# Patient Record
Sex: MALE | Race: WHITE | NOT HISPANIC OR LATINO | Employment: OTHER | URBAN - METROPOLITAN AREA
[De-identification: names, ages, dates, MRNs, and addresses within clinical notes are randomized per-mention and may not be internally consistent; named-entity substitution may affect disease eponyms.]

---

## 2017-02-07 ENCOUNTER — TELEPHONE (OUTPATIENT)
Dept: FAMILY MEDICINE | Facility: OTHER | Age: 42
End: 2017-02-07

## 2017-02-07 NOTE — TELEPHONE ENCOUNTER
Panel Management Review      Patient has the following on his problem list: None      Composite cancer screening  Chart review shows that this patient is due/due soon for the following None  Summary:    Patient is due/failing the following:   Creatinine, A1c    Action needed:   Patient needs non-fasting lab only appointment    Type of outreach:    Phone, spoke to patient.  spoke with patients wife Elida per [facility], she states she is unsure if the labs will be covered by insurance and will check with them and schedule lab appt if they are covered.    Questions for provider review:    None                                                                                                                                    Karen Swartz CMA       Chart routed to Care Team .

## 2017-02-22 DIAGNOSIS — Z72.0 TOBACCO ABUSE: ICD-10-CM

## 2017-02-23 NOTE — TELEPHONE ENCOUNTER
varenicline (CHANTIX) 1 MG tablet     Last Written Prescription Date: 11/2/2016  Last Fill Quantity: 56, # refills: 2  Last Office Visit with [facility], [facility] or [facility] prescribing provider: 11/9/2016        BP Readings from Last 3 Encounters:   11/17/16 121/86   11/10/16 124/90   11/09/16 110/76

## 2017-02-27 RX ORDER — VARENICLINE TARTRATE 1 MG/1
TABLET, FILM COATED ORAL
Qty: 56 TABLET | Refills: 2 | Status: SHIPPED | OUTPATIENT
Start: 2017-02-27 | End: 2018-01-04

## 2017-02-27 NOTE — TELEPHONE ENCOUNTER
Routing refill request to provider for review/approval because:  Patient has been given a starter pack along with the below Rx.   Per RN protocol: Chantix up to 12 weeks with one refill.    Patient is also due for non-fasting labs.     Carol Ha RN

## 2017-06-19 ENCOUNTER — ANESTHESIA EVENT (OUTPATIENT)
Dept: GASTROENTEROLOGY | Facility: CLINIC | Age: 42
End: 2017-06-19
Payer: COMMERCIAL

## 2017-06-19 ENCOUNTER — SURGERY (OUTPATIENT)
Age: 42
End: 2017-06-19

## 2017-06-19 ENCOUNTER — ANESTHESIA (OUTPATIENT)
Dept: GASTROENTEROLOGY | Facility: CLINIC | Age: 42
End: 2017-06-19
Payer: COMMERCIAL

## 2017-06-19 ENCOUNTER — HOSPITAL ENCOUNTER (OUTPATIENT)
Facility: CLINIC | Age: 42
Discharge: HOME | End: 2017-06-19
Attending: FAMILY MEDICINE | Admitting: FAMILY MEDICINE
Payer: COMMERCIAL

## 2017-06-19 VITALS
DIASTOLIC BLOOD PRESSURE: 98 MMHG | OXYGEN SATURATION: 100 % | SYSTOLIC BLOOD PRESSURE: 143 MMHG | TEMPERATURE: 97.8 F | RESPIRATION RATE: 16 BRPM

## 2017-06-19 LAB — COLONOSCOPY: NORMAL

## 2017-06-19 PROCEDURE — 25000125 ZZHC RX 250: Performed by: NURSE ANESTHETIST, CERTIFIED REGISTERED

## 2017-06-19 PROCEDURE — 45385 COLONOSCOPY W/LESION REMOVAL: CPT | Performed by: FAMILY MEDICINE

## 2017-06-19 PROCEDURE — 45385 COLONOSCOPY W/LESION REMOVAL: CPT | Mod: PT | Performed by: FAMILY MEDICINE

## 2017-06-19 PROCEDURE — 88305 TISSUE EXAM BY PATHOLOGIST: CPT | Performed by: FAMILY MEDICINE

## 2017-06-19 PROCEDURE — 25000128 H RX IP 250 OP 636: Performed by: NURSE ANESTHETIST, CERTIFIED REGISTERED

## 2017-06-19 PROCEDURE — 88305 TISSUE EXAM BY PATHOLOGIST: CPT | Mod: 26 | Performed by: FAMILY MEDICINE

## 2017-06-19 PROCEDURE — 45380 COLONOSCOPY AND BIOPSY: CPT | Mod: 59 | Performed by: FAMILY MEDICINE

## 2017-06-19 PROCEDURE — 40000296 ZZH STATISTIC ENDO RECOVERY CLASS 1:2 FIRST HOUR: Performed by: FAMILY MEDICINE

## 2017-06-19 PROCEDURE — 40000297 ZZH STATISTIC ENDO RECOVERY CLASS 1:2 EACH ADDL HOUR: Performed by: FAMILY MEDICINE

## 2017-06-19 PROCEDURE — 45380 COLONOSCOPY AND BIOPSY: CPT | Mod: PT,XU | Performed by: FAMILY MEDICINE

## 2017-06-19 RX ORDER — PROPOFOL 10 MG/ML
INJECTION, EMULSION INTRAVENOUS CONTINUOUS PRN
Status: DISCONTINUED | OUTPATIENT
Start: 2017-06-19 | End: 2017-06-19

## 2017-06-19 RX ORDER — LIDOCAINE HYDROCHLORIDE 20 MG/ML
INJECTION, SOLUTION INFILTRATION; PERINEURAL PRN
Status: DISCONTINUED | OUTPATIENT
Start: 2017-06-19 | End: 2017-06-19

## 2017-06-19 RX ORDER — SODIUM CHLORIDE, SODIUM LACTATE, POTASSIUM CHLORIDE, CALCIUM CHLORIDE 600; 310; 30; 20 MG/100ML; MG/100ML; MG/100ML; MG/100ML
INJECTION, SOLUTION INTRAVENOUS CONTINUOUS
Status: DISCONTINUED | OUTPATIENT
Start: 2017-06-19 | End: 2017-06-19 | Stop reason: HOSPADM

## 2017-06-19 RX ORDER — PROPOFOL 10 MG/ML
INJECTION, EMULSION INTRAVENOUS PRN
Status: DISCONTINUED | OUTPATIENT
Start: 2017-06-19 | End: 2017-06-19

## 2017-06-19 RX ORDER — ONDANSETRON 2 MG/ML
4 INJECTION INTRAMUSCULAR; INTRAVENOUS
Status: DISCONTINUED | OUTPATIENT
Start: 2017-06-19 | End: 2017-06-19 | Stop reason: HOSPADM

## 2017-06-19 RX ORDER — MEPERIDINE HYDROCHLORIDE 25 MG/ML
12.5 INJECTION INTRAMUSCULAR; INTRAVENOUS; SUBCUTANEOUS
Status: DISCONTINUED | OUTPATIENT
Start: 2017-06-19 | End: 2017-06-19 | Stop reason: HOSPADM

## 2017-06-19 RX ORDER — ONDANSETRON 2 MG/ML
4 INJECTION INTRAMUSCULAR; INTRAVENOUS EVERY 30 MIN PRN
Status: DISCONTINUED | OUTPATIENT
Start: 2017-06-19 | End: 2017-06-19 | Stop reason: HOSPADM

## 2017-06-19 RX ORDER — ONDANSETRON 4 MG/1
4 TABLET, ORALLY DISINTEGRATING ORAL EVERY 30 MIN PRN
Status: DISCONTINUED | OUTPATIENT
Start: 2017-06-19 | End: 2017-06-19 | Stop reason: HOSPADM

## 2017-06-19 RX ORDER — NALOXONE HYDROCHLORIDE 0.4 MG/ML
.1-.4 INJECTION, SOLUTION INTRAMUSCULAR; INTRAVENOUS; SUBCUTANEOUS
Status: DISCONTINUED | OUTPATIENT
Start: 2017-06-19 | End: 2017-06-19 | Stop reason: HOSPADM

## 2017-06-19 RX ORDER — LIDOCAINE 40 MG/G
CREAM TOPICAL
Status: DISCONTINUED | OUTPATIENT
Start: 2017-06-19 | End: 2017-06-19 | Stop reason: HOSPADM

## 2017-06-19 RX ORDER — LIDOCAINE 40 MG/G
CREAM TOPICAL
Status: DISCONTINUED | OUTPATIENT
Start: 2017-06-19 | End: 2017-06-19

## 2017-06-19 RX ADMIN — PROPOFOL 40 MG: 10 INJECTION, EMULSION INTRAVENOUS at 10:48

## 2017-06-19 RX ADMIN — SODIUM CHLORIDE, POTASSIUM CHLORIDE, SODIUM LACTATE AND CALCIUM CHLORIDE: 600; 310; 30; 20 INJECTION, SOLUTION INTRAVENOUS at 10:06

## 2017-06-19 RX ADMIN — LIDOCAINE HYDROCHLORIDE 1 ML: 10 INJECTION, SOLUTION EPIDURAL; INFILTRATION; INTRACAUDAL; PERINEURAL at 10:06

## 2017-06-19 RX ADMIN — PROPOFOL 150 MCG/KG/MIN: 10 INJECTION, EMULSION INTRAVENOUS at 10:54

## 2017-06-19 RX ADMIN — LIDOCAINE HYDROCHLORIDE 80 MG: 20 INJECTION, SOLUTION INFILTRATION; PERINEURAL at 10:40

## 2017-06-19 ASSESSMENT — LIFESTYLE VARIABLES: TOBACCO_USE: 1

## 2017-06-19 NOTE — ANESTHESIA POSTPROCEDURE EVALUATION
Patient: Oren Solano    Procedure(s):  colonoscopy with polypectomy by cold biopsy forceps, polypectomy by snare - Wound Class: II-Clean Contaminated   - Wound Class: II-Clean Contaminated    Diagnosis:screening, family hx colon cancer  Diagnosis Additional Information: No value filed.    Anesthesia Type:  MAC    Note:  Anesthesia Post Evaluation    Patient location during evaluation: Phase 2  Patient participation: Able to fully participate in evaluation  Level of consciousness: awake and alert  Pain management: adequate  Airway patency: patent  Cardiovascular status: blood pressure returned to baseline  Respiratory status: room air and spontaneous ventilation  Hydration status: euvolemic  PONV: none     Anesthetic complications: None    Comments: Patient denies any discomforts at this time. Resting without comlaint        Last vitals:  Vitals:    06/19/17 1012 06/19/17 1152 06/19/17 1200   BP: (!) 144/99 119/90 (!) 122/96   Resp:  16 16   Temp:      SpO2:  100% 100%         Electronically Signed By: ESTEFANÍA Estes CRNA  June 19, 2017  12:03 PM

## 2017-06-19 NOTE — DISCHARGE INSTRUCTIONS
[facility]    Home Care Following Endoscopy    Dr. Nance      Activity:    You have just undergone an endoscopic procedure usually performed with conscious sedation.  Do not work or operate machinery (including a car) for at least 12 hours.      I encourage you to walk and attempt to pass this air as soon as possible.    Diet:    Return to the diet you were on before your procedure but eat lightly for the first 12-24 hours.    Drink plenty of water.    Resume any regular medications unless otherwise advised by your physician.      If you had any biopsy or polyp removed please refrain from aspirin or aspirin products for 2 days.   Pain:    You may take Tylenol as needed for pain.  Expected Recovery:    You can expect some mild abdominal fullness and/or discomfort due to the air used to inflate your intestinal tract.     Call Your Physician if You Have:    After Colonoscopy:  o Worsening persisting abdominal pain which is worse with activity.  o Fevers (>101 degrees F), chills or shakes.  o Passage of continued blood with bowel movements.   Any questions or concerns about your recovery, please call [phone] or after hours [phone] Nurse Advice Line.    Follow-up Care:    You should receive a call or letter with your results within 1 week. Please call if you have not received a notification of your results.    If asked to return to clinic please make an appointment 1 week after your procedure.  Call [phone].    [facility]   Adult Discharge Orders & Instructions     For 24 hours after surgery    1. Get plenty of rest.  A responsible adult must stay with you for at least 24 hours after you leave the hospital.   2. Do not drive or use heavy equipment.    3. Do not drink alcohol.  4. Avoid strenuous or risky activities.  Ask for help when climbing stairs.   5. You may feel lightheaded.  IF so, sit for a few minutes before standing.  Have someone help you get up.   6. If you  have nausea (feel sick to your stomach): Drink only clear liquids such as apple juice, ginger ale, broth or 7-Up.  Rest may also help.  Be sure to drink enough fluids.  Move to a regular diet as you feel able.  7. You may have a slight fever. Call the doctor if your fever is over 100 F (37.7 C) (taken under the tongue) or lasts longer than 24 hours.  8. You may have a dry mouth, a sore throat, muscle aches or trouble sleeping.  These should go away after 24 hours.  9. Do not make important or legal decisions.   Call your doctor for any of the followin.  Signs of infection (fever, growing tenderness at the surgery site, a large amount of drainage or bleeding, severe pain, foul-smelling drainage, redness, swelling).    2. It has been over 8 to 10 hours since surgery and you are still not able to urinate (pass water).    3.  Headache for over 24 hours.

## 2017-06-19 NOTE — ANESTHESIA CARE TRANSFER NOTE
Patient: Oren Solano    Procedure(s):  colonoscopy with polypectomy by cold biopsy forceps, polypectomy by snare - Wound Class: II-Clean Contaminated   - Wound Class: II-Clean Contaminated    Diagnosis: screening, family hx colon cancer  Diagnosis Additional Information: No value filed.    Anesthesia Type:   MAC     Note:  Airway :Room Air  Patient transferred to:Phase II  Comments: Resting without complaint report to pacu rn      Vitals: (Last set prior to Anesthesia Care Transfer)    CRNA VITALS  6/19/2017 1120 - 6/19/2017 1202      6/19/2017             SpO2: 99 %                Electronically Signed By: ESTEFANÍA Estes CRNA  June 19, 2017  12:02 PM

## 2017-06-19 NOTE — IP AVS SNAPSHOT
MRN:[id number]                      After Visit Summary   6/19/2017    Oren Solano    MRN: [id number]           Thank you!     Thank you for choosing [facility] for your care. Our goal is always to provide you with excellent care. Hearing back from our patients is one way we can continue to improve our services. Please take a few minutes to complete the written survey that you may receive in the mail after you visit with us. Thank you!        Patient Information     Date Of Birth          1975        About your hospital stay     You were admitted on:  June 19, 2017 You last received care in the:  [facility]    You were discharged on:  June 19, 2017       Who to Call     For medical emergencies, please call 911.  For non-urgent questions about your medical care, please call your primary care provider or clinic, [phone]  For questions related to your surgery, please call your surgery clinic        Attending Provider     Provider Bipin Pires MD [facility] Practice       Primary Care Provider Office Phone # Fax #    Jewel Saxena PA-C [phone]      Further instructions from your care team         [facility]    Home Care Following Endoscopy    Dr. Nance      Activity:    You have just undergone an endoscopic procedure usually performed with conscious sedation.  Do not work or operate machinery (including a car) for at least 12 hours.      I encourage you to walk and attempt to pass this air as soon as possible.    Diet:    Return to the diet you were on before your procedure but eat lightly for the first 12-24 hours.    Drink plenty of water.    Resume any regular medications unless otherwise advised by your physician.      If you had any biopsy or polyp removed please refrain from aspirin or aspirin products for 2 days.   Pain:    You may take Tylenol as needed for pain.  Expected Recovery:    You can expect some  mild abdominal fullness and/or discomfort due to the air used to inflate your intestinal tract.     Call Your Physician if You Have:    After Colonoscopy:  o Worsening persisting abdominal pain which is worse with activity.  o Fevers (>101 degrees F), chills or shakes.  o Passage of continued blood with bowel movements.   Any questions or concerns about your recovery, please call [phone] or after hours [phone] Nurse Advice Line.    Follow-up Care:    You should receive a call or letter with your results within 1 week. Please call if you have not received a notification of your results.    If asked to return to clinic please make an appointment 1 week after your procedure.  Call [phone].    [facility]   Adult Discharge Orders & Instructions     For 24 hours after surgery    1. Get plenty of rest.  A responsible adult must stay with you for at least 24 hours after you leave the hospital.   2. Do not drive or use heavy equipment.    3. Do not drink alcohol.  4. Avoid strenuous or risky activities.  Ask for help when climbing stairs.   5. You may feel lightheaded.  IF so, sit for a few minutes before standing.  Have someone help you get up.   6. If you have nausea (feel sick to your stomach): Drink only clear liquids such as apple juice, ginger ale, broth or 7-Up.  Rest may also help.  Be sure to drink enough fluids.  Move to a regular diet as you feel able.  7. You may have a slight fever. Call the doctor if your fever is over 100 F (37.7 C) (taken under the tongue) or lasts longer than 24 hours.  8. You may have a dry mouth, a sore throat, muscle aches or trouble sleeping.  These should go away after 24 hours.  9. Do not make important or legal decisions.   Call your doctor for any of the followin.  Signs of infection (fever, growing tenderness at the surgery site, a large amount of drainage or bleeding, severe pain, foul-smelling drainage, redness, swelling).    2. It has been  "over 8 to 10 hours since surgery and you are still not able to urinate (pass water).    3.  Headache for over 24 hours.        Pending Results     Date and Time Order Name Status Description    2017 1058 Surgical pathology exam In process             Admission Information     Date & Time Provider Department Dept. Phone    2017 Bipin Nance MD [facility] [phone]      Your Vitals Were     Blood Pressure Temperature Respirations Pulse Oximetry          142/124 97.8  F (36.6  C) (Oral) 16 100%        MyChart Information     [facility] lets you send messages to your doctor, view your test results, renew your prescriptions, schedule appointments and more. To sign up, go to [email or web address] . Click on \"Log in\" on the left side of the screen, which will take you to the Welcome page. Then click on \"Sign up Now\" on the right side of the page.     You will be asked to enter the access code listed below, as well as some personal information. Please follow the directions to create your username and password.     Your access code is: [email or web address]  Expires: 2017 12:25 PM     Your access code will  in 90 days. If you need help or a new code, please call your [facility] clinic or [phone].        Care EveryWhere ID     This is your Care EveryWhere ID. This could be used by other organizations to access your [facility] medical records  [id number]           Review of your medicines      UNREVIEWED medicines. Ask your doctor about these medicines        Dose / Directions    ciprofloxacin 500 MG tablet   Commonly known as:  CIPRO   Used for:  Calculus of kidney        Dose:  500 mg   Take 1 tablet (500 mg) by mouth 2 times daily   Quantity:  6 tablet   Refills:  0       HYDROcodone-acetaminophen 5-325 MG per tablet   Commonly known as:  NORCO   Used for:  Calculus of kidney        Dose:  1-2 tablet   Take 1-2 tablets by mouth every 4 hours as needed for moderate to severe pain " (Moderate to Severe Pain)   Quantity:  30 tablet   Refills:  0       * IMITREX PO        Dose:  50 mg   Take 50 mg by mouth at onset of headache for migraine   Refills:  0       * SUMAtriptan 100 MG tablet   Commonly known as:  IMITREX   Used for:  Migraine without aura and without status migrainosus, not intractable        Dose:  100 mg   Take 1 tablet (100 mg) by mouth at onset of headache for migraine May repeat in 2 hours. Max 2 tablets/24 hours.   Quantity:  12 tablet   Refills:  11       lisinopril 10 MG tablet   Commonly known as:  PRINIVIL/ZESTRIL   Used for:  Benign essential hypertension        Dose:  10 mg   Take 1 tablet (10 mg) by mouth daily   Quantity:  90 tablet   Refills:  3       magnesium 100 MG Caps        Refills:  0       MULTIVITAMIN ADULT PO        Refills:  0       UNABLE TO FIND        MEDICATION NAME: Mark Jean Baptiste   Refills:  0       UNKNOWN TO PATIENT   Indication:  Unknown name - some sort of protein for migraines        Refills:  0       * varenicline 0.5 MG X 11 & 1 MG X 42 tablet   Commonly known as:  CHANTIX STARTING MONTH GUILLERMINA   Used for:  Tobacco abuse        Take 0.5 mg tab daily for 3 days, then 0.5 mg tab twice daily for 4 days, then 1 mg twice daily.   Quantity:  53 tablet   Refills:  0       * CHANTIX 1 MG tablet   Used for:  Tobacco abuse   Generic drug:  varenicline        TAKE ONE TABLET BY MOUTH TWICE DAILY   Quantity:  56 tablet   Refills:  2       * Notice:  This list has 4 medication(s) that are the same as other medications prescribed for you. Read the directions carefully, and ask your doctor or other care provider to review them with you.             Protect others around you: Learn how to safely use, store and throw away your medicines at www.disposemymeds.org.             Medication List: This is a list of all your medications and when to take them. Check marks below indicate your daily home schedule. Keep this list as a reference.      Medications            Morning Afternoon Evening Bedtime As Needed    ciprofloxacin 500 MG tablet   Commonly known as:  CIPRO   Take 1 tablet (500 mg) by mouth 2 times daily                                HYDROcodone-acetaminophen 5-325 MG per tablet   Commonly known as:  NORCO   Take 1-2 tablets by mouth every 4 hours as needed for moderate to severe pain (Moderate to Severe Pain)                                * IMITREX PO   Take 50 mg by mouth at onset of headache for migraine                                * SUMAtriptan 100 MG tablet   Commonly known as:  IMITREX   Take 1 tablet (100 mg) by mouth at onset of headache for migraine May repeat in 2 hours. Max 2 tablets/24 hours.                                lisinopril 10 MG tablet   Commonly known as:  PRINIVIL/ZESTRIL   Take 1 tablet (10 mg) by mouth daily                                magnesium 100 MG Caps                                MULTIVITAMIN ADULT PO                                UNABLE TO FIND   MEDICATION NAME: Mark Jean Baptiste                                UNKNOWN TO PATIENT                                * varenicline 0.5 MG X 11 & 1 MG X 42 tablet   Commonly known as:  CHANTIX STARTING MONTH GUILLERMINA   Take 0.5 mg tab daily for 3 days, then 0.5 mg tab twice daily for 4 days, then 1 mg twice daily.                                * CHANTIX 1 MG tablet   TAKE ONE TABLET BY MOUTH TWICE DAILY   Generic drug:  varenicline                                * Notice:  This list has 4 medication(s) that are the same as other medications prescribed for you. Read the directions carefully, and ask your doctor or other care provider to review them with you.

## 2017-06-19 NOTE — IP AVS SNAPSHOT
[facility]    [address] [address]    Phone:  [phone]                                       After Visit Summary   6/19/2017    Oren Solano    MRN: [id number]           After Visit Summary Signature Page     I have received my discharge instructions, and my questions have been answered. I have discussed any challenges I see with this plan with the nurse or doctor.    ..........................................................................................................................................  Patient/Patient Representative Signature      ..........................................................................................................................................  Patient Representative Print Name and Relationship to Patient    ..................................................               ................................................  Date                                            Time    ..........................................................................................................................................  Reviewed by Signature/Title    ...................................................              ..............................................  Date                                                            Time

## 2017-06-19 NOTE — ANESTHESIA PREPROCEDURE EVALUATION
Anesthesia Evaluation     . Pt has had prior anesthetic. Type: General           ROS/MED HX    ENT/Pulmonary:     (+)tobacco use, Current use , . .    Neurologic:     (+)migraines,     Cardiovascular:     (+) hypertension----. : . . . :. . No previous cardiac testing       METS/Exercise Tolerance:     Hematologic:  - neg hematologic  ROS       Musculoskeletal:  - neg musculoskeletal ROS       GI/Hepatic:  - neg GI/hepatic ROS       Renal/Genitourinary:     (+) Nephrolithiasis , Pt has no history of transplant,       Endo:  - neg endo ROS       Psychiatric:  - neg psychiatric ROS       Infectious Disease:  - neg infectious disease ROS       Malignancy:      - no malignancy   Other:    (+) No chance of pregnancy C-spine cleared: N/A,                    Physical Exam  Normal systems: cardiovascular, pulmonary and dental    Airway   Mallampati: II  TM distance: >3 FB  Neck ROM: full    Dental     Cardiovascular   Rhythm and rate: regular and normal      Pulmonary    breath sounds clear to auscultation                    Anesthesia Plan      History & Physical Review  History and physical reviewed and following examination; no interval change.    ASA Status:  2 .    NPO Status:  > 8 hours    Plan for MAC with Intravenous and Propofol induction. Reason for MAC:  Deep or markedly invasive procedure (G8)         Postoperative Care  Postoperative pain management:  Oral pain medications.      Consents  Anesthetic plan, risks, benefits and alternatives discussed with:  Patient..                          .

## 2017-06-19 NOTE — ANESTHESIA CARE TRANSFER NOTE
Patient: rOen Solano    Procedure(s):  colonoscopy with polypectomy by cold biopsy forceps, polypectomy by snare - Wound Class: II-Clean Contaminated   - Wound Class: II-Clean Contaminated    Diagnosis: screening, family hx colon cancer  Diagnosis Additional Information: No value filed.    Anesthesia Type:   MAC     Note:  Airway :Room Air  Patient transferred to:Phase II  Comments: Patient resting without complaint. Denies any anesthesia complications at this time.       Vitals: (Last set prior to Anesthesia Care Transfer)    CRNA VITALS  6/19/2017 1120 - 6/19/2017 1200      6/19/2017             SpO2: 99 %                Electronically Signed By: ESTEFANÍA Estes CRNA  June 19, 2017  12:00 PM

## 2017-06-22 LAB — COPATH REPORT: NORMAL

## 2017-11-30 DIAGNOSIS — I10 BENIGN ESSENTIAL HYPERTENSION: ICD-10-CM

## 2017-11-30 DIAGNOSIS — G43.009 MIGRAINE WITHOUT AURA AND WITHOUT STATUS MIGRAINOSUS, NOT INTRACTABLE: ICD-10-CM

## 2017-12-01 RX ORDER — SUMATRIPTAN 100 MG/1
TABLET, FILM COATED ORAL
Qty: 12 TABLET | Refills: 0 | Status: SHIPPED | OUTPATIENT
Start: 2017-12-01 | End: 2018-01-04

## 2017-12-01 RX ORDER — LISINOPRIL 10 MG/1
TABLET ORAL
Qty: 30 TABLET | Refills: 0 | Status: SHIPPED | OUTPATIENT
Start: 2017-12-01 | End: 2018-01-04

## 2017-12-01 NOTE — TELEPHONE ENCOUNTER
Called and spoke with patient regarding message below.  Bridgette Jovel CMA ([facility])

## 2017-12-28 NOTE — PROGRESS NOTES
SUBJECTIVE:                                                    Oren Solano is a 42 year old male who presents to clinic today for the following health issues:      History of Present Illness     Hypertension:     Outpatient blood pressures:  Are not being checked    Dietary sodium intake::  Low salt diet    Migraines:     Headache Symptoms are:  Stable    Migraine frequency::  2 per week    Migraine Duration::  1 day    Ability to perform ADL's::  No    Migraine Rescue/Relief Medications::  Sumatriptan (Imitrex)    Effectiveness of rescue/relief medications::  Moderate relief    Migraine Preventative Medications::  None    Neurological symptoms::  None    ER or UC Visits::  None    Diet:  Regular (no restrictions)  Frequency of exercise:  None  Taking medications regularly:  Yes  Medication side effects:  Other  Additional concerns today:  No    Answers for HPI/ROS submitted by the patient on 1/4/2018   PHQ-2 Score: 0      He usually experiences 1-2 migraines per months but during the Spring and Fall, it can be 2-3 per week. Imitrex continues to works well but he is interested in trying a preventative medication.    He has experiences a slight, intermittent daily chest pressure for the 2 years ever since starting blood pressure medications. It happened on amlodipine, metoprolol and now on lisinopril. He had a normal EKG and heart monitor in 2015 at [facility]. The symptoms have not worsened and are very mild. He denies any shortness of breath. He quit smoking 1 year ago.     Problem list and histories reviewed & adjusted, as indicated.  Additional history: none    ROS:  GENERAL: Denies fever, fatigue, weakness, weight gain, or weight loss.  CARDIOVASCULAR: +Mild daily chest tightness. Denies shortness of breath, irregular heartbeats, palpitations, or edema.  RESPIRATORY: Denies cough, hemoptysis, and shortness of breath.   NEUROLOGIC: +Migraines. Denies fainting, dizziness, memory loss, numbness, tingling,  "or seizures.  PSYCHIATRIC: Denies depression, anxiety, mood swings, and thoughts of suicide.    OBJECTIVE:     /82 (BP Location: Right arm, Patient Position: Chair, Cuff Size: Adult Regular)  Pulse 91  Temp 96.9  F (36.1  C) (Temporal)  Resp 18  Ht 5' 8.11\" (1.73 m)  Wt 161 lb (73 kg)  SpO2 99%  BMI 24.4 kg/m2  Body mass index is 24.4 kg/(m^2).  GENERAL: healthy, alert and no distress  RESP: lungs clear to auscultation - no rales, rhonchi or wheezes  CV: regular rate and rhythm, normal S1 S2, no S3 or S4, no murmur, click or rub  NEURO: Normal strength and tone, mentation intact and speech normal. Gait is stable.  PSYCH: mentation appears normal, affect normal/bright    ASSESSMENT/PLAN:         ICD-10-CM    1. Migraine without aura and without status migrainosus, not intractable G43.009 amitriptyline (ELAVIL) 10 MG tablet     SUMAtriptan (IMITREX) 100 MG tablet   2. Benign essential hypertension I10 Basic metabolic panel  (Ca, Cl, CO2, Creat, Gluc, K, Na, BUN)     Hemoglobin A1c     lisinopril (PRINIVIL/ZESTRIL) 10 MG tablet   3. Elevated fasting glucose R73.01 Basic metabolic panel  (Ca, Cl, CO2, Creat, Gluc, K, Na, BUN)     Hemoglobin A1c   4. CARDIOVASCULAR SCREENING; LDL GOAL LESS THAN 160 Z13.6 Lipid panel reflex to direct LDL Fasting         1. Will continue with Imitrex for abortive migraine treatment but I think he would also benefit from a preventative medication. Will start him on amitriptyline 10 mg nightly for 1 week and can increase to 20 mg nightly thereafter if tolerating well. Common side effects discussed and I recommend follow up in 2-3 months for a recheck.    2. BP is stable on lisinopril. He has had mild, intermittent chest discomfort since starting this medication 2 years ago but this has not changed and it not overly bothersome. He denies any other cardiac symptoms and had a normal EKG and cardiac monitor when symptoms started in 2015. He has not tolerated metoprolol or " amlodipine. I discussed trying hydrochlorithiazide but he would like to stick with lisinopril and will let me know if symptoms worsen.     3-4. He will complete fasting labs and an A1c at his convenience due to history of elevated fasting glucose. I discussed a healthier, low sugar diet and routine exercise.      Jewel Saxena PA-C  [facility]

## 2018-01-04 ENCOUNTER — OFFICE VISIT (OUTPATIENT)
Dept: FAMILY MEDICINE | Facility: OTHER | Age: 43
End: 2018-01-04
Payer: COMMERCIAL

## 2018-01-04 VITALS
RESPIRATION RATE: 18 BRPM | SYSTOLIC BLOOD PRESSURE: 124 MMHG | DIASTOLIC BLOOD PRESSURE: 82 MMHG | HEART RATE: 91 BPM | WEIGHT: 161 LBS | OXYGEN SATURATION: 99 % | HEIGHT: 68 IN | BODY MASS INDEX: 24.4 KG/M2 | TEMPERATURE: 96.9 F

## 2018-01-04 DIAGNOSIS — I10 BENIGN ESSENTIAL HYPERTENSION: ICD-10-CM

## 2018-01-04 DIAGNOSIS — G43.009 MIGRAINE WITHOUT AURA AND WITHOUT STATUS MIGRAINOSUS, NOT INTRACTABLE: Primary | ICD-10-CM

## 2018-01-04 DIAGNOSIS — R73.01 ELEVATED FASTING GLUCOSE: ICD-10-CM

## 2018-01-04 DIAGNOSIS — Z13.6 CARDIOVASCULAR SCREENING; LDL GOAL LESS THAN 160: ICD-10-CM

## 2018-01-04 PROCEDURE — 99214 OFFICE O/P EST MOD 30 MIN: CPT | Performed by: PHYSICIAN ASSISTANT

## 2018-01-04 RX ORDER — LISINOPRIL 10 MG/1
10 TABLET ORAL DAILY
Qty: 90 TABLET | Refills: 3 | Status: SHIPPED | OUTPATIENT
Start: 2018-01-04 | End: 2019-01-14

## 2018-01-04 RX ORDER — AMITRIPTYLINE HYDROCHLORIDE 10 MG/1
TABLET ORAL
Qty: 60 TABLET | Refills: 5 | Status: SHIPPED | OUTPATIENT
Start: 2018-01-04 | End: 2018-11-15

## 2018-01-04 RX ORDER — SUMATRIPTAN 100 MG/1
TABLET, FILM COATED ORAL
Qty: 12 TABLET | Refills: 5 | Status: SHIPPED | OUTPATIENT
Start: 2018-01-04 | End: 2019-03-04

## 2018-01-04 NOTE — NURSING NOTE
"Chief Complaint   Patient presents with     Hypertension     Headache     Health Maintenance     tdap, flu, height, mychart       Initial /82 (BP Location: Right arm, Patient Position: Chair, Cuff Size: Adult Regular)  Pulse 91  Temp 96.9  F (36.1  C) (Temporal)  Resp 18  Ht 5' 8.11\" (1.73 m)  Wt 161 lb (73 kg)  SpO2 99%  BMI 24.4 kg/m2 Estimated body mass index is 24.4 kg/(m^2) as calculated from the following:    Height as of this encounter: 5' 8.11\" (1.73 m).    Weight as of this encounter: 161 lb (73 kg).  Medication Reconciliation: complete     Lucretia Patricio CMA      "

## 2018-01-04 NOTE — PATIENT INSTRUCTIONS
Continue with current medications. If the chest discomfort worsens, let me know.    Will start you on amitriptyline to take 1 tablet nightly for 1 week then 2 tablets nightly thereafter to help prevent migraines.  Continue with Imitrex as needed.    Come in for fasting labs within the next week.     Follow up in 2-3 months for a recheck.

## 2018-01-04 NOTE — MR AVS SNAPSHOT
After Visit Summary   1/4/2018    Oren Solano    MRN: [id number]           Patient Information     Date Of Birth          1975        Visit Information        Provider Department      1/4/2018 10:30 AM Jewel Saxena PA-C [facility]        Today's Diagnoses     Migraine without aura and without status migrainosus, not intractable    -  1    Elevated fasting glucose        Benign essential hypertension          Care Instructions    Continue with current medications. If the chest discomfort worsens, let me know.    Will start you on amitriptyline to take 1 tablet nightly for 1 week then 2 tablets nightly thereafter to help prevent migraines.  Continue with Imitrex as needed.    Come in for fasting labs within the next week.     Follow up in 2-3 months for a recheck.           Follow-ups after your visit        Future tests that were ordered for you today     Open Future Orders        Priority Expected Expires Ordered    Basic metabolic panel  (Ca, Cl, CO2, Creat, Gluc, K, Na, BUN) Routine 1/5/2018 2/12/2018 1/4/2018    Lipid panel reflex to direct LDL Fasting Routine 1/5/2018 2/19/2018 1/4/2018    Hemoglobin A1c Routine 1/5/2018 2/12/2018 1/4/2018            Who to contact     If you have questions or need follow up information about today's clinic visit or your schedule please contact [facility] directly at [phone].  Normal or non-critical lab and imaging results will be communicated to you by MyChart, letter or phone within 4 business days after the clinic has received the results. If you do not hear from us within 7 days, please contact the clinic through MyChart or phone. If you have a critical or abnormal lab result, we will notify you by phone as soon as possible.  Submit refill requests through [employer] or call your pharmacy and they will forward the refill request to us. Please allow 3 business days for your refill to be completed.           "Additional Information About Your Visit        MyChart Information     [employer] lets you send messages to your doctor, view your test results, renew your prescriptions, schedule appointments and more. To sign up, go to [email or web address] . Click on \"Log in\" on the left side of the screen, which will take you to the Welcome page. Then click on \"Sign up Now\" on the right side of the page.     You will be asked to enter the access code listed below, as well as some personal information. Please follow the directions to create your username and password.     Your access code is: [email or web address]  Expires: 2018 11:12 AM     Your access code will  in 90 days. If you need help or a new code, please call your [facility] clinic or [phone].        Care EveryWhere ID     This is your Care EveryWhere ID. This could be used by other organizations to access your [facility] medical records  [id number]        Your Vitals Were     Pulse Temperature Respirations Height Pulse Oximetry BMI (Body Mass Index)    91 96.9  F (36.1  C) (Temporal) 18 5' 8.11\" (1.73 m) 99% 24.4 kg/m2       Blood Pressure from Last 3 Encounters:   18 124/82   17 (!) 143/98   16 121/86    Weight from Last 3 Encounters:   18 161 lb (73 kg)   11/10/16 146 lb 8 oz (66.5 kg)   16 146 lb 8 oz (66.5 kg)                 Today's Medication Changes          These changes are accurate as of: 18 11:12 AM.  If you have any questions, ask your nurse or doctor.               Start taking these medicines.        Dose/Directions    amitriptyline 10 MG tablet   Commonly known as:  ELAVIL   Used for:  Migraine without aura and without status migrainosus, not intractable   Started by:  Jewel Saxena PA-C        Take 1 tablet nightly for 1 week then 2 tablets nightly thereafter   Quantity:  60 tablet   Refills:  5         These medicines have changed or have updated prescriptions.        Dose/Directions    lisinopril 10 MG " tablet   Commonly known as:  PRINIVIL/ZESTRIL   This may have changed:  See the new instructions.   Used for:  Benign essential hypertension   Changed by:  Jewel Saxena PA-C        Dose:  10 mg   Take 1 tablet (10 mg) by mouth daily   Quantity:  90 tablet   Refills:  3       SUMAtriptan 100 MG tablet   Commonly known as:  IMITREX   This may have changed:  See the new instructions.   Used for:  Migraine without aura and without status migrainosus, not intractable   Changed by:  Jewel Saxena PA-C        TAKE 1 TABLET BY MOUTH AT ONSET OF HEADACHE FOR MIGRAINE. MAY REPEAT DOSE IN 2 HRS. MAX OF 2 TABS/24HRS   Quantity:  12 tablet   Refills:  5         Stop taking these medicines if you haven't already. Please contact your care team if you have questions.     CHANTIX 1 MG tablet   Generic drug:  varenicline   Stopped by:  Jewel Saxena PA-C           HYDROcodone-acetaminophen 5-325 MG per tablet   Commonly known as:  NORCO   Stopped by:  Jewel Saxena PA-C           varenicline 0.5 MG X 11 & 1 MG X 42 tablet   Commonly known as:  CHANTIX STARTING MONTH GUILLERMINA   Stopped by:  Jewel Saxena PA-C                Where to get your medicines      These medications were sent to [facility] [address] [place] [id number] [place] [id number]     Phone:  [phone]     amitriptyline 10 MG tablet    lisinopril 10 MG tablet    SUMAtriptan 100 MG tablet                Primary Care Provider Office Phone # Fax #    Jewel Saxena PA-C [phone]       [address] [place] [address]        Equal Access to Services     [facility] AH: Hadii aad ku hadasho Soomaali, waaxda luqadaha, qaybta kaalmada adeegyada, isabelle sarah. So [facility] [phone].    ATENCIÓN: Si habla español, tiene a jarrell disposición servicios gratuitos de asistencia lingüística. Llame al [phone].    We comply with applicable federal  civil rights laws and [place] laws. We do not discriminate on the basis of race, color, national origin, age, disability, sex, sexual orientation, or gender identity.            Thank you!     Thank you for choosing [facility]  for your care. Our goal is always to provide you with excellent care. Hearing back from our patients is one way we can continue to improve our services. Please take a few minutes to complete the written survey that you may receive in the mail after your visit with us. Thank you!             Your Updated Medication List - Protect others around you: Learn how to safely use, store and throw away your medicines at www.disposemymeds.org.          This list is accurate as of: 1/4/18 11:12 AM.  Always use your most recent med list.                   Brand Name Dispense Instructions for use Diagnosis    amitriptyline 10 MG tablet    ELAVIL    60 tablet    Take 1 tablet nightly for 1 week then 2 tablets nightly thereafter    Migraine without aura and without status migrainosus, not intractable       lisinopril 10 MG tablet    PRINIVIL/ZESTRIL    90 tablet    Take 1 tablet (10 mg) by mouth daily    Benign essential hypertension       magnesium 100 MG Caps           MULTIVITAMIN ADULT PO           SUMAtriptan 100 MG tablet    IMITREX    12 tablet    TAKE 1 TABLET BY MOUTH AT ONSET OF HEADACHE FOR MIGRAINE. MAY REPEAT DOSE IN 2 HRS. MAX OF 2 TABS/24HRS    Migraine without aura and without status migrainosus, not intractable       UNABLE TO FIND      MEDICATION NAME: Mark Jean Baptiste

## 2018-01-12 ENCOUNTER — OFFICE VISIT (OUTPATIENT)
Dept: FAMILY MEDICINE | Facility: CLINIC | Age: 43
End: 2018-01-12
Payer: COMMERCIAL

## 2018-01-12 ENCOUNTER — TELEPHONE (OUTPATIENT)
Dept: FAMILY MEDICINE | Facility: OTHER | Age: 43
End: 2018-01-12

## 2018-01-12 VITALS
BODY MASS INDEX: 24.13 KG/M2 | HEIGHT: 68 IN | SYSTOLIC BLOOD PRESSURE: 130 MMHG | WEIGHT: 159.2 LBS | RESPIRATION RATE: 16 BRPM | DIASTOLIC BLOOD PRESSURE: 84 MMHG | OXYGEN SATURATION: 100 % | TEMPERATURE: 97.8 F | HEART RATE: 99 BPM

## 2018-01-12 DIAGNOSIS — J01.90 ACUTE SINUSITIS WITH SYMPTOMS > 10 DAYS: Primary | ICD-10-CM

## 2018-01-12 DIAGNOSIS — Z23 NEED FOR VACCINATION: ICD-10-CM

## 2018-01-12 PROCEDURE — 99213 OFFICE O/P EST LOW 20 MIN: CPT | Mod: 25 | Performed by: PHYSICIAN ASSISTANT

## 2018-01-12 PROCEDURE — 90471 IMMUNIZATION ADMIN: CPT | Performed by: PHYSICIAN ASSISTANT

## 2018-01-12 PROCEDURE — 90715 TDAP VACCINE 7 YRS/> IM: CPT | Performed by: PHYSICIAN ASSISTANT

## 2018-01-12 RX ORDER — DOXYCYCLINE 100 MG/1
100 CAPSULE ORAL 2 TIMES DAILY
Qty: 20 CAPSULE | Refills: 0 | Status: SHIPPED | OUTPATIENT
Start: 2018-01-12 | End: 2018-03-12

## 2018-01-12 ASSESSMENT — PATIENT HEALTH QUESTIONNAIRE - PHQ9
SUM OF ALL RESPONSES TO PHQ QUESTIONS 1-9: 6
SUM OF ALL RESPONSES TO PHQ QUESTIONS 1-9: 6

## 2018-01-12 ASSESSMENT — ANXIETY QUESTIONNAIRES
1. FEELING NERVOUS, ANXIOUS, OR ON EDGE: NOT AT ALL
6. BECOMING EASILY ANNOYED OR IRRITABLE: NOT AT ALL
GAD7 TOTAL SCORE: 0
7. FEELING AFRAID AS IF SOMETHING AWFUL MIGHT HAPPEN: NOT AT ALL
GAD7 TOTAL SCORE: 0
GAD7 TOTAL SCORE: 0
7. FEELING AFRAID AS IF SOMETHING AWFUL MIGHT HAPPEN: NOT AT ALL
5. BEING SO RESTLESS THAT IT IS HARD TO SIT STILL: NOT AT ALL
3. WORRYING TOO MUCH ABOUT DIFFERENT THINGS: NOT AT ALL
4. TROUBLE RELAXING: NOT AT ALL
2. NOT BEING ABLE TO STOP OR CONTROL WORRYING: NOT AT ALL

## 2018-01-12 ASSESSMENT — PAIN SCALES - GENERAL: PAINLEVEL: NO PAIN (0)

## 2018-01-12 NOTE — PATIENT INSTRUCTIONS
Sinusitis (Antibiotic Treatment)    The sinuses are air-filled spaces within the bones of the face. They connect to the inside of the nose. Sinusitis is an inflammation of the tissue lining the sinus cavity. Sinus inflammation can occur during a cold. It can also be due to allergies to pollens and other particles in the air. Sinusitis can cause symptoms of sinus congestion and fullness. A sinus infection causes fever, headache and facial pain. There is often green or yellow drainage from the nose or into the back of the throat (post-nasal drip). You have been given antibiotics to treat this condition.  Home care:    Take the full course of antibiotics as instructed. Do not stop taking them, even if you feel better.    Drink plenty of water, hot tea, and other liquids. This may help thin mucus. It also may promote sinus drainage.    Heat may help soothe painful areas of the face. Use a towel soaked in hot water. Or,  the shower and direct the hot spray onto your face. Using a vaporizer along with a menthol rub at night may also help.     An expectorant containing guaifenesin may help thin the mucus and promote drainage from the sinuses.    Over-the-counter decongestants may be used unless a similar medicine was prescribed. Nasal sprays work the fastest. Use one that contains phenylephrine or oxymetazoline. First blow the nose gently. Then use the spray. Do not use these medicines more often than directed on the label or symptoms may get worse. You may also use tablets containing pseudoephedrine. Avoid products that combine ingredients, because side effects may be increased. Read labels. You can also ask the pharmacist for help. (NOTE: Persons with high blood pressure should not use decongestants. They can raise blood pressure.)    Over-the-counter antihistamines may help if allergies contributed to your sinusitis.      Do not use nasal rinses or irrigation during an acute sinus infection, unless told to by  your health care provider. Rinsing may spread the infection to other sinuses.    Use acetaminophen or ibuprofen to control pain, unless another pain medicine was prescribed. (If you have chronic liver or kidney disease or ever had a stomach ulcer, talk with your doctor before using these medicines. Aspirin should never be used in anyone under 18 years of age who is ill with a fever. It may cause severe liver damage.)    Don't smoke. This can worsen symptoms.  Follow-up care  Follow up with your healthcare provider or our staff if you are not improving within the next week.  When to seek medical advice  Call your healthcare provider if any of these occur:    Facial pain or headache becoming more severe    Stiff neck    Unusual drowsiness or confusion    Swelling of the forehead or eyelids    Vision problems, including blurred or double vision    Fever of 100.4 F (38 C) or higher, or as directed by your healthcare provider    Seizure    Breathing problems    Symptoms not resolving within 10 days  Date Last Reviewed: 4/13/2015 2000-2017 [employer]. [address] [place] [address], [place] [id number]. All rights reserved. This information is not intended as a substitute for professional medical care. Always follow your healthcare professional's instructions.

## 2018-01-12 NOTE — NURSING NOTE
"Chief Complaint   Patient presents with     Sinus Problem     Panel Management     Migrane action plan, tetanus, flu shot        Initial /84  Pulse 99  Temp 97.8  F (36.6  C) (Temporal)  Resp 16  Ht 5' 7.87\" (1.724 m)  Wt 159 lb 3.2 oz (72.2 kg)  SpO2 100%  BMI 24.3 kg/m2 Estimated body mass index is 24.3 kg/(m^2) as calculated from the following:    Height as of this encounter: 5' 7.87\" (1.724 m).    Weight as of this encounter: 159 lb 3.2 oz (72.2 kg).  Medication Reconciliation: complete     Sirisha Peacock MA       "

## 2018-01-12 NOTE — PROGRESS NOTES
"  SUBJECTIVE:                                                    Oren Solano is a 42 year old male who presents to clinic today for the following health issues:      HPI    Acute Illness   Acute illness concerns: cough, sinus problem   Onset: ongoing for 2 weeks   Daughter from [place] to visit and was sick with cold sx during visit. He got her cold and was sick for 7-10days. Most of sx are better.   But has continued to have cough that is worse at night with laying, pressure in nose, along teeth, and sore throat. \"Hovering\" symptoms that are not getting better or improving.     Fever: no - low grade- Tmax- 99.2 1 week ago.     Chills/Sweats: no     Headache (location?): YES    Sinus Pressure:YES- face, around eyes. A lot of pressure above teeth.     Conjunctivitis:  no    Ear Pain: no    Rhinorrhea: YES    Congestion: YES    Sore Throat: YES     Cough: YES, Dry cough , green sputum. Cough at night. Dry cough day. Phlegm more in am.   No CP, no SOB.      Wheeze: no     Decreased Appetite: YES    Nausea: no     Vomiting: no     Diarrhea:  no     Dysuria/Freq.: no     Fatigue/Achiness: YES, fatigue no body aches     Sick/Strep Exposure: YES, daughter was sick recently     No rashes related to illness.   No leg swelling    Work-   No travel       Therapies Tried and outcome: Patient has tried benadryl and he states that it has been helping with nasal congestion.     Hypertension Follow-up      Outpatient blood pressures are not being checked.    Low Salt Diet: no added salt          Problem list and histories reviewed & adjusted, as indicated.  Additional history: as documented      Patient Active Problem List   Diagnosis     Migraine without aura and without status migrainosus, not intractable     Benign essential hypertension     Family history of colon cancer     Renal stones     Calculus of kidney     Past Surgical History:   Procedure Laterality Date     COLONOSCOPY N/A 6/19/2017    Procedure: COMBINED " COLONOSCOPY, SINGLE OR MULTIPLE BIOPSY/POLYPECTOMY BY BIOPSY;  colonoscopy with polypectomy by cold biopsy forceps, polypectomy by snare;  Surgeon: Biipn Nance MD;  Location: [facility]     LASER HOLMIUM LITHOTRIPSY URETER(S), INSERT STENT, COMBINED Left 11/10/2016    Procedure: COMBINED CYSTOSCOPY, URETEROSCOPY, LASER HOLMIUM LITHOTRIPSY URETER(S), INSERT STENT;  Surgeon: Chris Leone MD;  Location:  OR       Social History   Substance Use Topics     Smoking status: Former Smoker     Packs/day: 0.75     Types: Cigarettes     Quit date: 1/13/2017     Smokeless tobacco: Never Used      Comment: Quit 01/13/2017     Alcohol use Yes      Comment: rare. one beer a month     Family History   Problem Relation Age of Onset     Colon Polyps Brother      Colon Cancer Maternal Grandfather          Current Outpatient Prescriptions   Medication Sig Dispense Refill     doxycycline (VIBRAMYCIN) 100 MG capsule Take 1 capsule (100 mg) by mouth 2 times daily 20 capsule 0     amitriptyline (ELAVIL) 10 MG tablet Take 1 tablet nightly for 1 week then 2 tablets nightly thereafter 60 tablet 5     SUMAtriptan (IMITREX) 100 MG tablet TAKE 1 TABLET BY MOUTH AT ONSET OF HEADACHE FOR MIGRAINE. MAY REPEAT DOSE IN 2 HRS. MAX OF 2 TABS/24HRS 12 tablet 5     lisinopril (PRINIVIL/ZESTRIL) 10 MG tablet Take 1 tablet (10 mg) by mouth daily 90 tablet 3     Multiple Vitamins-Minerals (MULTIVITAMIN ADULT PO)        magnesium 100 MG CAPS        UNABLE TO FIND MEDICATION NAME: Mark Caballero [place]       Allergies   Allergen Reactions     Amoxicillin GI Disturbance     Seasonal Allergies      BP Readings from Last 3 Encounters:   01/12/18 130/84   01/04/18 124/82   06/19/17 (!) 143/98    Wt Readings from Last 3 Encounters:   01/12/18 159 lb 3.2 oz (72.2 kg)   01/04/18 161 lb (73 kg)   11/10/16 146 lb 8 oz (66.5 kg)                  Labs reviewed in EPIC        ROS:  Constitutional, HEENT, cardiovascular, pulmonary, gi and gu systems are  "negative, except as otherwise noted.      OBJECTIVE:   /84  Pulse 99  Temp 97.8  F (36.6  C) (Temporal)  Resp 16  Ht 5' 7.87\" (1.724 m)  Wt 159 lb 3.2 oz (72.2 kg)  SpO2 100%  BMI 24.3 kg/m2  Body mass index is 24.3 kg/(m^2).  GENERAL: well appearing, alert and no distress   EYES: Eyes grossly normal to inspection, PERRL and conjunctivae and sclerae normal  HENT: Normal cephalic/atraumatic. Maxillary sinuses TTP, frontal sinus TTP. Ear canals clear and TM's normal, no effusion. Nose mucosa erythematous and swollen turbinates. Lips normal, no lesions. Buccal muscosa moist. Soft palate no lesions or ulcers. Tonsils normal, no significant erythema, no exudates. Uvula midline. Posterior oropharynx mild erythema, no drainage.   NECK: no adenopathy and no asymmetry, masses, or scars  RESP: lungs clear to auscultation - no rales, rhonchi or wheezes  CV: regular rate and rhythm, normal S1 S2, no S3 or S4, no murmur, click or rub  SKIN: no suspicious lesions or rashes      Diagnostic Test Results:  none     ASSESSMENT/PLAN:     1. Acute sinusitis with symptoms > 10 days  Antibiotics as below  - doxycycline (VIBRAMYCIN) 100 MG capsule; Take 1 capsule (100 mg) by mouth 2 times daily  Dispense: 20 capsule; Refill: 0    2. Need for vaccination  given  - TDAP VACCINE (ADACEL) [99377.002]  - 1st  Administration  [77779]    Follow Up: For worsening symptoms (ie new fevers, worsening pain, etc), non-improvement as expected/discussed, questions regarding your medications or treatment plan. Discussed parameters for follow up and included in After Visit Summary given to patient.      Yadira Astudillo PA-C  [facility])"

## 2018-01-12 NOTE — MR AVS SNAPSHOT
After Visit Summary   1/12/2018    Oren Solano    MRN: [id number]           Patient Information     Date Of Birth          1975        Visit Information        Provider Department      1/12/2018 2:40 PM Yadira Astudillo PA-C [facility]        Today's Diagnoses     Acute sinusitis with symptoms > 10 days    -  1      Care Instructions      Sinusitis (Antibiotic Treatment)    The sinuses are air-filled spaces within the bones of the face. They connect to the inside of the nose. Sinusitis is an inflammation of the tissue lining the sinus cavity. Sinus inflammation can occur during a cold. It can also be due to allergies to pollens and other particles in the air. Sinusitis can cause symptoms of sinus congestion and fullness. A sinus infection causes fever, headache and facial pain. There is often green or yellow drainage from the nose or into the back of the throat (post-nasal drip). You have been given antibiotics to treat this condition.  Home care:    Take the full course of antibiotics as instructed. Do not stop taking them, even if you feel better.    Drink plenty of water, hot tea, and other liquids. This may help thin mucus. It also may promote sinus drainage.    Heat may help soothe painful areas of the face. Use a towel soaked in hot water. Or,  the shower and direct the hot spray onto your face. Using a vaporizer along with a menthol rub at night may also help.     An expectorant containing guaifenesin may help thin the mucus and promote drainage from the sinuses.    Over-the-counter decongestants may be used unless a similar medicine was prescribed. Nasal sprays work the fastest. Use one that contains phenylephrine or oxymetazoline. First blow the nose gently. Then use the spray. Do not use these medicines more often than directed on the label or symptoms may get worse. You may also use tablets containing pseudoephedrine. Avoid products that combine ingredients,  because side effects may be increased. Read labels. You can also ask the pharmacist for help. (NOTE: Persons with high blood pressure should not use decongestants. They can raise blood pressure.)    Over-the-counter antihistamines may help if allergies contributed to your sinusitis.      Do not use nasal rinses or irrigation during an acute sinus infection, unless told to by your health care provider. Rinsing may spread the infection to other sinuses.    Use acetaminophen or ibuprofen to control pain, unless another pain medicine was prescribed. (If you have chronic liver or kidney disease or ever had a stomach ulcer, talk with your doctor before using these medicines. Aspirin should never be used in anyone under 18 years of age who is ill with a fever. It may cause severe liver damage.)    Don't smoke. This can worsen symptoms.  Follow-up care  Follow up with your healthcare provider or our staff if you are not improving within the next week.  When to seek medical advice  Call your healthcare provider if any of these occur:    Facial pain or headache becoming more severe    Stiff neck    Unusual drowsiness or confusion    Swelling of the forehead or eyelids    Vision problems, including blurred or double vision    Fever of 100.4 F (38 C) or higher, or as directed by your healthcare provider    Seizure    Breathing problems    Symptoms not resolving within 10 days  Date Last Reviewed: 4/13/2015 2000-2017 [employer]. [address] [place] [address], [place] [address]. All rights reserved. This information is not intended as a substitute for professional medical care. Always follow your healthcare professional's instructions.                Follow-ups after your visit        Who to contact     If you have questions or need follow up information about today's clinic visit or your schedule please contact [facility] directly at [phone].  Normal or non-critical lab and imaging results will be  "communicated to you by [employer], letter or phone within 4 business days after the clinic has received the results. If you do not hear from us within 7 days, please contact the clinic through [employer] or phone. If you have a critical or abnormal lab result, we will notify you by phone as soon as possible.  Submit refill requests through [employer] or call your pharmacy and they will forward the refill request to us. Please allow 3 business days for your refill to be completed.          Additional Information About Your Visit        [employer] Information     [employer] lets you send messages to your doctor, view your test results, renew your prescriptions, schedule appointments and more. To sign up, go to [email or web address] . Click on \"Log in\" on the left side of the screen, which will take you to the Welcome page. Then click on \"Sign up Now\" on the right side of the page.     You will be asked to enter the access code listed below, as well as some personal information. Please follow the directions to create your username and password.     Your access code is: [email or web address]  Expires: 2018 11:12 AM     Your access code will  in 90 days. If you need help or a new code, please call your [place] clinic or [phone].        Care EveryWhere ID     This is your Care EveryWhere ID. This could be used by other organizations to access your [place] medical records  [id number]        Your Vitals Were     Pulse Temperature Respirations Height Pulse Oximetry BMI (Body Mass Index)    99 97.8  F (36.6  C) (Temporal) 16 5' 7.87\" (1.724 m) 100% 24.3 kg/m2       Blood Pressure from Last 3 Encounters:   18 130/84   18 124/82   17 (!) 143/98    Weight from Last 3 Encounters:   18 159 lb 3.2 oz (72.2 kg)   18 161 lb (73 kg)   11/10/16 146 lb 8 oz (66.5 kg)              Today, you had the following     No orders found for display         Today's Medication Changes          These changes are accurate " as of: 1/12/18  3:12 PM.  If you have any questions, ask your nurse or doctor.               Start taking these medicines.        Dose/Directions    doxycycline 100 MG capsule   Commonly known as:  VIBRAMYCIN   Used for:  Acute sinusitis with symptoms > 10 days   Started by:  Yadira Astudillo PA-C        Dose:  100 mg   Take 1 capsule (100 mg) by mouth 2 times daily   Quantity:  20 capsule   Refills:  0            Where to get your medicines      These medications were sent to [facility] [facility] [address] [facility]  [address] [facility] [address]     Phone:  [phone]     doxycycline 100 MG capsule                Primary Care Provider Office Phone # Fax #    Jewel Wm Saxena PA-C [phone]       [address] [place] [address] [address]  [facility] [address]        Equal Access to Services     EVELINE BRITO : Hadii aad ku hadasho Soholden, waaxda luqadaha, qaybta kaalmada adecatinayajuan r, isabelle guadalupe . So [facility] [phone].    ATENCIÓN: Si habla español, tiene a jarrell disposición servicios gratuitos de asistencia lingüística. Kyaw al [phone].    We comply with applicable federal civil rights laws and [place] laws. We do not discriminate on the basis of race, color, national origin, age, disability, sex, sexual orientation, or gender identity.            Thank you!     Thank you for choosing [facility]  for your care. Our goal is always to provide you with excellent care. Hearing back from our patients is one way we can continue to improve our services. Please take a few minutes to complete the written survey that you may receive in the mail after your visit with us. Thank you!             Your Updated Medication List - Protect others around you: Learn how to safely use, store and throw away your medicines at www.disposemymeds.org.          This list is accurate as of: 1/12/18  3:12 PM.  Always use your most recent med list.                    Brand Name Dispense Instructions for use Diagnosis    amitriptyline 10 MG tablet    ELAVIL    60 tablet    Take 1 tablet nightly for 1 week then 2 tablets nightly thereafter    Migraine without aura and without status migrainosus, not intractable       doxycycline 100 MG capsule    VIBRAMYCIN    20 capsule    Take 1 capsule (100 mg) by mouth 2 times daily    Acute sinusitis with symptoms > 10 days       lisinopril 10 MG tablet    PRINIVIL/ZESTRIL    90 tablet    Take 1 tablet (10 mg) by mouth daily    Benign essential hypertension       magnesium 100 MG Caps           MULTIVITAMIN ADULT PO           SUMAtriptan 100 MG tablet    IMITREX    12 tablet    TAKE 1 TABLET BY MOUTH AT ONSET OF HEADACHE FOR MIGRAINE. MAY REPEAT DOSE IN 2 HRS. MAX OF 2 TABS/24HRS    Migraine without aura and without status migrainosus, not intractable       UNABLE TO FIND      MEDICATION NAME: Mark Jean Baptiste

## 2018-01-13 ASSESSMENT — ANXIETY QUESTIONNAIRES: GAD7 TOTAL SCORE: 0

## 2018-01-13 ASSESSMENT — PATIENT HEALTH QUESTIONNAIRE - PHQ9: SUM OF ALL RESPONSES TO PHQ QUESTIONS 1-9: 6

## 2018-03-01 ENCOUNTER — TELEPHONE (OUTPATIENT)
Dept: FAMILY MEDICINE | Facility: OTHER | Age: 43
End: 2018-03-01

## 2018-03-01 NOTE — TELEPHONE ENCOUNTER
Panel Management Review    Summary:    Patient is due/failing the following:   A1C, BMP, Lipid - follow up April 2018    Action needed:   Patient needs office visit for HTN f/u. and Patient needs fasting lab appointment    Type of outreach:    Phone, left message for patient to call back.     Questions for provider review:    None    Patient has the following on his problem list:     Hypertension   Last three blood pressure readings:  BP Readings from Last 3 Encounters:   01/12/18 130/84   01/04/18 124/82   06/19/17 (!) 143/98     Blood pressure: MONITOR    HTN Guidelines:  Age 18-59 BP range:  Less than 140/90  Age 60-85 with Diabetes:  Less than 140/90  Age 60-85 without Diabetes:  less than 150/90      Composite cancer screening  Chart review shows that this patient is due/due soon for the following None                                                                                                                                      Lucretia Patricio CMA     Chart routed to Care Team .

## 2018-03-08 NOTE — PROGRESS NOTES
"  SUBJECTIVE:   Oren Solano is a 43 year old male who presents to clinic today for the following health issues:      HPI     Concern - Heel Pain  Onset: 2-3 weeks    Description:   Right heel pain - \"right where tendon is on the back of my heel\"    Intensity: moderate    Progression of Symptoms:  same    Accompanying Signs & Symptoms:  \"tenderness to the touch\"  \"might be swelling but hard to tell\"    Previous history of similar problem:   none    Precipitating factors:   Worsened by: touch, movement, heat, worse in the morning    Alleviating factors:  Improved by: resting, ice    Therapies Tried and outcome: Advil with no relief        He has noticed pain in the right heel over the past few weeks and is wondering if it is due to overuse and walking on the cement floor at home. He has been trying to walk with shoes more at home since the pain began. He denies any fall or injury but pain is worse in the mornings and when getting up after an hour or longer of sitting. He states that stretching his ankle while points his toes upwards helps.     Migraines have been improved since starting amitriptyline as they are occurring less frequently with less intensity. He also states this has helped some of his anxious thoughts as well but he has gained some weight since starting the medication.    Hypertension Follow-up      Outpatient blood pressures are not being checked.    Low Salt Diet: no added salt      Problem list and histories reviewed & adjusted, as indicated.  Additional history: none    ROS:  GENERAL: Denies fever, fatigue, weakness, weight gain, or weight loss.  CARDIOVASCULAR: Denies chest pain, shortness of breath, irregular heartbeats,  palpitations, or edema.  RESPIRATORY: Denies cough, hemoptysis, and shortness of breath.  MUSCULOSKELETAL:+Right heel pain.  NEUROLOGIC: +Improving migraines. Denies fainting, dizziness, memory loss, numbness, tingling, or seizures.      OBJECTIVE:     /80 (BP Location: " Left arm, Patient Position: Chair, Cuff Size: Adult Regular)  Pulse 69  Temp 98.2  F (36.8  C) (Temporal)  Resp 18  Wt 170 lb (77.1 kg)  SpO2 100%  BMI 25.94 kg/m2  Body mass index is 25.94 kg/(m^2).  GENERAL: healthy, alert and no distress  RESP: lungs clear to auscultation - no rales, rhonchi or wheezes  CV: regular rate and rhythm, normal S1 S2, no S3 or S4, no murmur, click or rub  MS: no gross musculoskeletal defects noted. Tenderness over the inferior, posterior right heel below the Achilles tendon insertion. No swelling. Negative Walker test. Full ankle range of motion.   NEURO: Normal strength and tone, mentation intact and speech normal. Cranial nerves II-XII are grossly intact. DTRs are 2+/4 throughout and symmetric. Gait is stable including tip toe gait.   PSYCH: mentation appears normal, affect normal/bright      Results for orders placed or performed in visit on 03/12/18   Basic metabolic panel  (Ca, Cl, CO2, Creat, Gluc, K, Na, BUN)   Result Value Ref Range    Sodium 141 133 - 144 mmol/L    Potassium 4.5 3.4 - 5.3 mmol/L    Chloride 106 94 - 109 mmol/L    Carbon Dioxide 30 20 - 32 mmol/L    Anion Gap 5 3 - 14 mmol/L    Glucose 105 (H) 70 - 99 mg/dL    Urea Nitrogen 17 7 - 30 mg/dL    Creatinine 1.15 0.66 - 1.25 mg/dL    GFR Estimate 69 >60 mL/min/1.7m2    GFR Estimate If Black 83 >60 mL/min/1.7m2    Calcium 8.9 8.5 - 10.1 mg/dL   Lipid panel reflex to direct LDL Fasting   Result Value Ref Range    Cholesterol 224 (H) <200 mg/dL    Triglycerides 251 (H) <150 mg/dL    HDL Cholesterol 46 >39 mg/dL    LDL Cholesterol Calculated 128 (H) <100 mg/dL    Non HDL Cholesterol 178 (H) <130 mg/dL   Hemoglobin A1c   Result Value Ref Range    Hemoglobin A1C 5.4 4.3 - 6.0 %       ASSESSMENT/PLAN:       ICD-10-CM    1. Benign essential hypertension I10 Basic metabolic panel  (Ca, Cl, CO2, Creat, Gluc, K, Na, BUN)     Hemoglobin A1c   2. Elevated fasting glucose R73.01 Basic metabolic panel  (Ca, Cl, CO2, Creat,  Gluc, K, Na, BUN)     Hemoglobin A1c   3. Plantar fasciitis M72.2    4. Migraine without aura and without status migrainosus, not intractable G43.009    5. CARDIOVASCULAR SCREENING; LDL GOAL LESS THAN 160 Z13.6 Lipid panel reflex to direct LDL Fasting       1. Blood pressure has been stable so will continue with current dose of lisinopril. Kidney function is stable on labs today.     2. Elevated fasting glucose but A1c is normal. I discussed the importance of a healthy, low carb, low sugar diet and routine exercise.    3. Right heel pain consistent with plantar fasciitis. Handout provided.  I recommend he purchase a heel cup to better support the heel and he should wear shoes at all times when walking on hard floors.  Continue with NSAIDs and stretching as needed. If symptoms are not improving, will send to podiatry.    4. Improved on amitriptyline. He states he will be undergoing a migraine trial for the next year so will be off Imitrex during that time.     5. Mildly elevated cholesterol and triglycerides. I recommend a daily fish oil along with a healthier diet and routine exercise as noted above.     The 10-year ASCVD risk score (Hector MURPHY Jr, et al., 2013) is: 3.6%    Values used to calculate the score:      Age: 43 years      Sex: Male      Is Non- : No      Diabetic: No      Tobacco smoker: No      Systolic Blood Pressure: 136 mmHg      Is BP treated: Yes      HDL Cholesterol: 46 mg/dL      Total Cholesterol: 224 mg/dL      Follow up annually.     Jewel Saxena PA-C  [facility]

## 2018-03-12 ENCOUNTER — OFFICE VISIT (OUTPATIENT)
Dept: FAMILY MEDICINE | Facility: OTHER | Age: 43
End: 2018-03-12
Payer: COMMERCIAL

## 2018-03-12 VITALS
SYSTOLIC BLOOD PRESSURE: 136 MMHG | OXYGEN SATURATION: 100 % | BODY MASS INDEX: 25.94 KG/M2 | TEMPERATURE: 98.2 F | DIASTOLIC BLOOD PRESSURE: 80 MMHG | HEART RATE: 69 BPM | WEIGHT: 170 LBS | RESPIRATION RATE: 18 BRPM

## 2018-03-12 DIAGNOSIS — I10 BENIGN ESSENTIAL HYPERTENSION: Primary | ICD-10-CM

## 2018-03-12 DIAGNOSIS — Z13.6 CARDIOVASCULAR SCREENING; LDL GOAL LESS THAN 160: ICD-10-CM

## 2018-03-12 DIAGNOSIS — R73.01 ELEVATED FASTING GLUCOSE: ICD-10-CM

## 2018-03-12 DIAGNOSIS — G43.009 MIGRAINE WITHOUT AURA AND WITHOUT STATUS MIGRAINOSUS, NOT INTRACTABLE: ICD-10-CM

## 2018-03-12 DIAGNOSIS — M72.2 PLANTAR FASCIITIS: ICD-10-CM

## 2018-03-12 LAB
ANION GAP SERPL CALCULATED.3IONS-SCNC: 5 MMOL/L (ref 3–14)
BUN SERPL-MCNC: 17 MG/DL (ref 7–30)
CALCIUM SERPL-MCNC: 8.9 MG/DL (ref 8.5–10.1)
CHLORIDE SERPL-SCNC: 106 MMOL/L (ref 94–109)
CHOLEST SERPL-MCNC: 224 MG/DL
CO2 SERPL-SCNC: 30 MMOL/L (ref 20–32)
CREAT SERPL-MCNC: 1.15 MG/DL (ref 0.66–1.25)
GFR SERPL CREATININE-BSD FRML MDRD: 69 ML/MIN/1.7M2
GLUCOSE SERPL-MCNC: 105 MG/DL (ref 70–99)
HBA1C MFR BLD: 5.4 % (ref 4.3–6)
HDLC SERPL-MCNC: 46 MG/DL
LDLC SERPL CALC-MCNC: 128 MG/DL
NONHDLC SERPL-MCNC: 178 MG/DL
POTASSIUM SERPL-SCNC: 4.5 MMOL/L (ref 3.4–5.3)
SODIUM SERPL-SCNC: 141 MMOL/L (ref 133–144)
TRIGL SERPL-MCNC: 251 MG/DL

## 2018-03-12 PROCEDURE — 80048 BASIC METABOLIC PNL TOTAL CA: CPT | Performed by: PHYSICIAN ASSISTANT

## 2018-03-12 PROCEDURE — 36415 COLL VENOUS BLD VENIPUNCTURE: CPT | Performed by: PHYSICIAN ASSISTANT

## 2018-03-12 PROCEDURE — 80061 LIPID PANEL: CPT | Performed by: PHYSICIAN ASSISTANT

## 2018-03-12 PROCEDURE — 83036 HEMOGLOBIN GLYCOSYLATED A1C: CPT | Performed by: PHYSICIAN ASSISTANT

## 2018-03-12 PROCEDURE — 99214 OFFICE O/P EST MOD 30 MIN: CPT | Performed by: PHYSICIAN ASSISTANT

## 2018-03-12 ASSESSMENT — PAIN SCALES - GENERAL: PAINLEVEL: NO PAIN (0)

## 2018-03-12 NOTE — PATIENT INSTRUCTIONS
Continue with current dose of lisinopril. Blood pressure is well controlled.    I am glad the amitriptyline is working well.    Your heel pain is consistent with plantar fasciitis.  I recommend an over the counter heel cup insert to help with better foot support.  Wear shoes when walking on hard floors at home.  Continue with stretching exercises.  Let me know if symptoms are not improving.      Understanding Plantar Fasciitis    Plantar fasciitis is a condition that causes foot and heel pain. The plantar fascia is a tough band of tissue that runs across the bottom of the foot from the heel to the toes. This tissue pulls on the heel bone. It supports the arch of the foot as it pushes off the ground. If the tissue becomes irritated or red and swollen (inflamed), it is called plantar fasciitis.  How to say it  PLAN-tuhr fa-see-IY-[id number]   What causes plantar fasciitis?  Plantar fasciitis most often occurs from overusing the plantar fascia. The tissue may become damaged from activities that put repeated stress on the heel and foot. Or it may wear down over time with age and ankle stiffness. You are more likely to have plantar fasciitis if you:    Do activities that require a lot of running, jumping, or dancing    Have a job that requires being on your feet for long periods    Are overweight or obese    Have certain foot problems, such as a tight Achilles tendon, flat feet, or high arches    Often wear poorly fitting shoes  Symptoms of plantar fasciitis  The condition most often causes pain in the heel and the bottom of the foot. The pain may occur when you take your first steps in the morning. It may get better as you walk throughout the day. But as you continue to put weight on the foot, the pain often returns. Pain may also occur after standing or sitting for long periods.  Treating plantar fasciitis  Treatments for plantar fasciitis include:    Resting the foot. This involves limiting movements that make your foot  hurt. You may also need to avoid certain sports and types of work for a time.    Using cold packs. Put an ice pack on the heel and foot to help reduce pain and swelling.    Taking pain medicines. Prescription and over-the-counter pain medicines can help relieve pain and swelling.    Using heel cups or foot inserts (orthotics). These are placed in the shoes to help support the heel or arch and cushion the heel. You may also be told to buy proper-fitting shoes with good arch support and cushioned soles.    Taping the foot. This supports the arch and limits the movement of the plantar fascia to help relieve symptoms.    Wearing a night splint. This stretches the plantar fascia and leg muscles while you sleep. This may help relieve pain.    Doing exercises and physical therapy. These stretch and strengthen the plantar fascia and the muscles in the leg that support the heel and foot.    Getting shots of medicine into the foot. These may help relieve symptoms for a time.    Having surgery. This may be needed if other treatments fail to relieve symptoms. During surgery, the surgeon may partially cut the plantar fascia to release tension.  Possible complications of plantar fasciitis  Without proper care and treatment, healing may take longer than normal. Also, symptoms may continue or get worse. Over time, the plantar fascia may be damaged. This can make it hard to walk or even stand without pain.  When to call your healthcare provider  Call your healthcare provider right away if you have any of these:    Fever of 100.4 F (38 C) or higher, or as directed    Symptoms that don t get better with treatment, or get worse    New symptoms, such as numbness, tingling, or weakness in the foot   Date Last Reviewed: 3/10/2016    2102-9716 [employer]. [address] [place] [address]. All rights reserved. This information is not intended as a substitute for professional medical care. Always follow your  healthcare professional's instructions.

## 2018-03-12 NOTE — NURSING NOTE
"Chief Complaint   Patient presents with     Hypertension     Pain     Panel Management     mychart       Initial /80 (BP Location: Left arm, Patient Position: Chair, Cuff Size: Adult Regular)  Pulse 69  Temp 98.2  F (36.8  C) (Temporal)  Resp 18  Wt 170 lb (77.1 kg)  SpO2 100%  BMI 25.94 kg/m2 Estimated body mass index is 25.94 kg/(m^2) as calculated from the following:    Height as of 1/12/18: 5' 7.87\" (1.724 m).    Weight as of this encounter: 170 lb (77.1 kg).  Medication Reconciliation: complete     Lucretia Patricio CMA      "

## 2018-03-12 NOTE — MR AVS SNAPSHOT
After Visit Summary   3/12/2018    Oren Solano    MRN: [id number]           Patient Information     Date Of Birth          1975        Visit Information        Provider Department      3/12/2018 9:00 AM Jewel Saxena PA-C [facility]        Today's Diagnoses     Benign essential hypertension    -  1    Elevated fasting glucose        Plantar fasciitis        Migraine without aura and without status migrainosus, not intractable        CARDIOVASCULAR SCREENING; LDL GOAL LESS THAN 160          Care Instructions    Continue with current dose of lisinopril. Blood pressure is well controlled.    I am glad the amitriptyline is working well.    Your heel pain is consistent with plantar fasciitis.  I recommend an over the counter heel cup insert to help with better foot support.  Wear shoes when walking on hard floors at home.  Continue with stretching exercises.  Let me know if symptoms are not improving.      Understanding Plantar Fasciitis    Plantar fasciitis is a condition that causes foot and heel pain. The plantar fascia is a tough band of tissue that runs across the bottom of the foot from the heel to the toes. This tissue pulls on the heel bone. It supports the arch of the foot as it pushes off the ground. If the tissue becomes irritated or red and swollen (inflamed), it is called plantar fasciitis.  How to say it  PLAN-tuhr fa-see-IY-tis   What causes plantar fasciitis?  Plantar fasciitis most often occurs from overusing the plantar fascia. The tissue may become damaged from activities that put repeated stress on the heel and foot. Or it may wear down over time with age and ankle stiffness. You are more likely to have plantar fasciitis if you:    Do activities that require a lot of running, jumping, or dancing    Have a job that requires being on your feet for long periods    Are overweight or obese    Have certain foot problems, such as a tight Achilles tendon, flat  feet, or high arches    Often wear poorly fitting shoes  Symptoms of plantar fasciitis  The condition most often causes pain in the heel and the bottom of the foot. The pain may occur when you take your first steps in the morning. It may get better as you walk throughout the day. But as you continue to put weight on the foot, the pain often returns. Pain may also occur after standing or sitting for long periods.  Treating plantar fasciitis  Treatments for plantar fasciitis include:    Resting the foot. This involves limiting movements that make your foot hurt. You may also need to avoid certain sports and types of work for a time.    Using cold packs. Put an ice pack on the heel and foot to help reduce pain and swelling.    Taking pain medicines. Prescription and over-the-counter pain medicines can help relieve pain and swelling.    Using heel cups or foot inserts (orthotics). These are placed in the shoes to help support the heel or arch and cushion the heel. You may also be told to buy proper-fitting shoes with good arch support and cushioned soles.    Taping the foot. This supports the arch and limits the movement of the plantar fascia to help relieve symptoms.    Wearing a night splint. This stretches the plantar fascia and leg muscles while you sleep. This may help relieve pain.    Doing exercises and physical therapy. These stretch and strengthen the plantar fascia and the muscles in the leg that support the heel and foot.    Getting shots of medicine into the foot. These may help relieve symptoms for a time.    Having surgery. This may be needed if other treatments fail to relieve symptoms. During surgery, the surgeon may partially cut the plantar fascia to release tension.  Possible complications of plantar fasciitis  Without proper care and treatment, healing may take longer than normal. Also, symptoms may continue or get worse. Over time, the plantar fascia may be damaged. This can make it hard to walk or  "even stand without pain.  When to call your healthcare provider  Call your healthcare provider right away if you have any of these:    Fever of 100.4 F (38 C) or higher, or as directed    Symptoms that don t get better with treatment, or get worse    New symptoms, such as numbness, tingling, or weakness in the foot   Date Last Reviewed: 3/10/2016    2418-8665 [employer]. [address] [place] [address], [place] [address]. All rights reserved. This information is not intended as a substitute for professional medical care. Always follow your healthcare professional's instructions.                Follow-ups after your visit        Who to contact     If you have questions or need follow up information about today's clinic visit or your schedule please contact [facility] directly at [phone].  Normal or non-critical lab and imaging results will be communicated to you by MyChart, letter or phone within 4 business days after the clinic has received the results. If you do not hear from us within 7 days, please contact the clinic through MyChart or phone. If you have a critical or abnormal lab result, we will notify you by phone as soon as possible.  Submit refill requests through [employer] or call your pharmacy and they will forward the refill request to us. Please allow 3 business days for your refill to be completed.          Additional Information About Your Visit        [employer] Information     [employer] lets you send messages to your doctor, view your test results, renew your prescriptions, schedule appointments and more. To sign up, go to [email or web address] . Click on \"Log in\" on the left side of the screen, which will take you to the Welcome page. Then click on \"Sign up Now\" on the right side of the page.     You will be asked to enter the access code listed below, as well as some personal information. Please follow the directions to create your username and password.     Your access code " is: [email or web address]  Expires: 2018 12:12 PM     Your access code will  in 90 days. If you need help or a new code, please call your [facility] clinic or [phone].        Care EveryWhere ID     This is your Care EveryWhere ID. This could be used by other organizations to access your [facility] medical records  [id number]        Your Vitals Were     Pulse Temperature Respirations Pulse Oximetry BMI (Body Mass Index)       69 98.2  F (36.8  C) (Temporal) 18 100% 25.94 kg/m2        Blood Pressure from Last 3 Encounters:   18 136/80   18 130/84   18 124/82    Weight from Last 3 Encounters:   18 170 lb (77.1 kg)   18 159 lb 3.2 oz (72.2 kg)   18 161 lb (73 kg)              We Performed the Following     Basic metabolic panel  (Ca, Cl, CO2, Creat, Gluc, K, Na, BUN)     Hemoglobin A1c     Lipid panel reflex to direct LDL Fasting        Primary Care Provider Office Phone # Fax #    Jewel Saxena PA-C [phone]       [address] [address]        Equal Access to Services     EVELINE BRITO AH: Hadii rakesh ku hadasho Soomaali, waaxda luqadaha, qaybta kaalmada adeegyada, isabelle sarah. So [facility] [phone].    ATENCIÓN: Si habla español, tiene a jarrell disposición servicios gratuitos de asistencia lingüística. Llame al [phone].    We comply with applicable federal civil rights laws and [place] laws. We do not discriminate on the basis of race, color, national origin, age, disability, sex, sexual orientation, or gender identity.            Thank you!     Thank you for choosing [facility]  for your care. Our goal is always to provide you with excellent care. Hearing back from our patients is one way we can continue to improve our services. Please take a few minutes to complete the written survey that you may receive in the mail after your visit with us. Thank you!             Your Updated Medication List  - Protect others around you: Learn how to safely use, store and throw away your medicines at www.disposemymeds.org.          This list is accurate as of 3/12/18  9:28 AM.  Always use your most recent med list.                   Brand Name Dispense Instructions for use Diagnosis    amitriptyline 10 MG tablet    ELAVIL    60 tablet    Take 1 tablet nightly for 1 week then 2 tablets nightly thereafter    Migraine without aura and without status migrainosus, not intractable       lisinopril 10 MG tablet    PRINIVIL/ZESTRIL    90 tablet    Take 1 tablet (10 mg) by mouth daily    Benign essential hypertension       magnesium 100 MG Caps           MULTIVITAMIN ADULT PO           SUMAtriptan 100 MG tablet    IMITREX    12 tablet    TAKE 1 TABLET BY MOUTH AT ONSET OF HEADACHE FOR MIGRAINE. MAY REPEAT DOSE IN 2 HRS. MAX OF 2 TABS/24HRS    Migraine without aura and without status migrainosus, not intractable       UNABLE TO FIND      MEDICATION NAME: Mark Jean Baptiste

## 2018-11-15 DIAGNOSIS — G43.009 MIGRAINE WITHOUT AURA AND WITHOUT STATUS MIGRAINOSUS, NOT INTRACTABLE: ICD-10-CM

## 2018-11-16 RX ORDER — AMITRIPTYLINE HYDROCHLORIDE 10 MG/1
TABLET ORAL
Qty: 60 TABLET | Refills: 5 | Status: SHIPPED | OUTPATIENT
Start: 2018-11-16 | End: 2019-03-04

## 2018-11-16 NOTE — TELEPHONE ENCOUNTER
"Requested Prescriptions   Pending Prescriptions Disp Refills     amitriptyline (ELAVIL) 10 MG tablet [Pharmacy Med Name: AMITRIPTYLIN 10MG   TAB] 60 tablet 5     Sig: TAKE ONE TABLET BY MOUTH ONCE NIGHTLY FOR 1 WEEK,THEN TAKE TWO TABLETS NIGHTLY THEREAFTER    Tricyclic Agents ( Annual appt and no PHQ9) Passed    11/15/2018 11:04 AM       Passed - Blood Pressure under 140/90 in past 12 mos    BP Readings from Last 3 Encounters:   03/12/18 136/80   01/12/18 130/84   01/04/18 124/82          Passed - Recent (12 mo) or future (30 days) visit within authorizing provider's specialty    Patient had office visit in the last 12 months or has a visit in the next 30 days with authorizing provider or within the authorizing provider's specialty.  See \"Patient Info\" tab in inbasket, or \"Choose Columns\" in Meds & Orders section of the refill encounter.           Passed - Patient is age 18 or older      amitriptyline (ELAVIL) 10 MG tablet  Routing refill request to provider for review/approval because:  A break in medication, should have needed refills 07/2018    Carol aH RN, BSN           "

## 2019-01-11 NOTE — PROGRESS NOTES
SUBJECTIVE:   Oren Solano is a 43 year old male who presents to clinic today for the following health issues:      Hypertension     Hypertension:     Outpatient blood pressures:  Are being checked    Blood pressures checked at:  Home    Dietary sodium intake::  No added salt diet    Rash  Onset: 2 weeks    Description:   Location: left wrist  Character: round, raised, burning, red  Itching (Pruritis): YES    Progression of Symptoms:  same    Accompanying Signs & Symptoms:  Fever: no   Body aches or joint pain: no   Sore throat symptoms: no   Recent cold symptoms: no     History:   Previous similar rash: no     Precipitating factors:   Exposure to similar rash: YES  New exposures: Recently had tattoo finished. Tattoo was in the process of healing, also raising hedgehogs and may have not properly cleaned afterwards   Recent travel: no     Alleviating factors:  None    Therapies Tried and outcome: Lotrimin - did not help    Problem list and histories reviewed & adjusted, as indicated.  Additional history: as documented    Patient Active Problem List   Diagnosis     Migraine without aura and without status migrainosus, not intractable     Benign essential hypertension     Family history of colon cancer     Renal stones     Calculus of kidney     Past Surgical History:   Procedure Laterality Date     COLONOSCOPY N/A 6/19/2017    Procedure: COMBINED COLONOSCOPY, SINGLE OR MULTIPLE BIOPSY/POLYPECTOMY BY BIOPSY;  colonoscopy with polypectomy by cold biopsy forceps, polypectomy by snare;  Surgeon: Bipin Nance MD;  Location:  GI     LASER HOLMIUM LITHOTRIPSY URETER(S), INSERT STENT, COMBINED Left 11/10/2016    Procedure: COMBINED CYSTOSCOPY, URETEROSCOPY, LASER HOLMIUM LITHOTRIPSY URETER(S), INSERT STENT;  Surgeon: Chris Leone MD;  Location:  OR       Social History     Tobacco Use     Smoking status: Former Smoker     Packs/day: 0.75     Types: Cigarettes     Last attempt to quit: 1/13/2017      "Years since quittin.0     Smokeless tobacco: Never Used     Tobacco comment: Quit 2017   Substance Use Topics     Alcohol use: Yes     Comment: rare. one beer a month     Family History   Problem Relation Age of Onset     Colon Polyps Brother      Colon Cancer Maternal Grandfather          Current Outpatient Medications   Medication Sig Dispense Refill     amitriptyline (ELAVIL) 10 MG tablet TAKE ONE TABLET BY MOUTH ONCE NIGHTLY FOR 1 WEEK,THEN TAKE TWO TABLETS NIGHTLY THEREAFTER 60 tablet 5     lisinopril (PRINIVIL/ZESTRIL) 10 MG tablet Take 1 tablet (10 mg) by mouth daily 30 tablet 0     magnesium 100 MG CAPS        Multiple Vitamins-Minerals (MULTIVITAMIN ADULT PO)        SUMAtriptan (IMITREX) 100 MG tablet TAKE 1 TABLET BY MOUTH AT ONSET OF HEADACHE FOR MIGRAINE. MAY REPEAT DOSE IN 2 HRS. MAX OF 2 TABS/24HRS 12 tablet 5     terbinafine (LAMISIL) 250 MG tablet Take 1 tablet (250 mg) by mouth daily for 10 days 10 tablet 0     UNABLE TO FIND MEDICATION NAME: Mark Jean Baptiste       Allergies   Allergen Reactions     Amoxicillin GI Disturbance     Seasonal Allergies      BP Readings from Last 3 Encounters:   19 (!) 140/110   18 136/80   18 130/84    Wt Readings from Last 3 Encounters:   19 75.8 kg (167 lb)   18 77.1 kg (170 lb)   18 72.2 kg (159 lb 3.2 oz)                  Labs reviewed in EPIC    ROS:  Constitutional, HEENT, cardiovascular, pulmonary, gi and gu systems are negative, except as otherwise noted.    OBJECTIVE:     BP (!) 140/110   Pulse 76   Temp 97.9  F (36.6  C) (Temporal)   Resp 16   Ht 1.728 m (5' 8.03\")   Wt 75.8 kg (167 lb)   BMI 25.37 kg/m    Body mass index is 25.37 kg/m .  GENERAL: alert and no distress  RESP: lungs clear to auscultation - no rales, rhonchi or wheezes  CV: regular rate and rhythm, normal S1 S2, no S3 or S4, no murmur, click or rub  MS: no gross musculoskeletal defects noted, no edema  SKIN: round lesion on left dorsal distal " arm with raised border and hypopigmented center    Diagnostic Test Results:  none     ASSESSMENT/PLAN:     1. Tinea corporis  Lesion has not responded to topical antifungal so will treat with oral terbinafine. Follow up if symptoms persist or worsen.  - terbinafine (LAMISIL) 250 MG tablet; Take 1 tablet (250 mg) by mouth daily for 10 days  Dispense: 10 tablet; Refill: 0    2. Benign essential hypertension  Patient ran out of lisinopril. Refilled for one month and asked patient to have his BP rechecked at the pharmacy in 10-14 days to make sure it is at goal at this dose. I will send in further refills if BP recheck is at goal.   - lisinopril (PRINIVIL/ZESTRIL) 10 MG tablet; Take 1 tablet (10 mg) by mouth daily  Dispense: 30 tablet; Refill: 0    ESTEFANÍA Barber [facility]

## 2019-01-14 ENCOUNTER — OFFICE VISIT (OUTPATIENT)
Dept: FAMILY MEDICINE | Facility: OTHER | Age: 44
End: 2019-01-14
Payer: COMMERCIAL

## 2019-01-14 VITALS
TEMPERATURE: 97.9 F | HEART RATE: 76 BPM | RESPIRATION RATE: 16 BRPM | SYSTOLIC BLOOD PRESSURE: 140 MMHG | WEIGHT: 167 LBS | DIASTOLIC BLOOD PRESSURE: 110 MMHG | HEIGHT: 68 IN | BODY MASS INDEX: 25.31 KG/M2

## 2019-01-14 DIAGNOSIS — I10 BENIGN ESSENTIAL HYPERTENSION: ICD-10-CM

## 2019-01-14 DIAGNOSIS — B35.4 TINEA CORPORIS: Primary | ICD-10-CM

## 2019-01-14 PROCEDURE — 99213 OFFICE O/P EST LOW 20 MIN: CPT | Performed by: STUDENT IN AN ORGANIZED HEALTH CARE EDUCATION/TRAINING PROGRAM

## 2019-01-14 RX ORDER — TERBINAFINE HYDROCHLORIDE 250 MG/1
250 TABLET ORAL DAILY
Qty: 10 TABLET | Refills: 0 | Status: SHIPPED | OUTPATIENT
Start: 2019-01-14 | End: 2019-01-24

## 2019-01-14 RX ORDER — LISINOPRIL 10 MG/1
10 TABLET ORAL DAILY
Qty: 30 TABLET | Refills: 0 | Status: SHIPPED | OUTPATIENT
Start: 2019-01-14 | End: 2019-03-04

## 2019-01-14 ASSESSMENT — MIFFLIN-ST. JEOR: SCORE: 1617.5

## 2019-01-14 NOTE — PATIENT INSTRUCTIONS
Take terbinafine 250 mg daily for 10 days for ringworm.    Refill of lisinopril 10 mg for 30 days. Have your blood pressure rechecked in 10-14 days at a [place] pharmacy and I will send in further refills if your blood pressure is at goal.    Haily Beaulieu, VALERIE-C

## 2019-03-01 NOTE — PROGRESS NOTES
SUBJECTIVE:   Oren Solano is a 44 year old male who presents to clinic today for the following health issues:      History of Present Illness     Hypertension:     Outpatient blood pressures:  Are not being checked    Dietary sodium intake::  Low salt diet    Diet:  Regular (no restrictions)  Frequency of exercise:  None  Taking medications regularly:  Yes  Medication side effects:  Not applicable  Additional concerns today:  No    His blood pressure has been much better controlled since he refilled the lisinopril. His migraines have also been well controlled on the 20 mg of amitriptyline nightly, down from 2-3 per week to 2-3 per month. He remains in a migraine study but states this is almost finished. He has Imitrex on hand as needed.     Problem list and histories reviewed & adjusted, as indicated.  Additional history: none    ROS:  GENERAL: Denies fever, fatigue, weakness, weight gain, or weight loss.  CARDIOVASCULAR: Denies chest pain, shortness of breath, irregular heartbeats, palpitations, or edema.  RESPIRATORY: Denies cough, hemoptysis, and shortness of breath.  NEUROLOGIC: Denies headache, fainting, dizziness, memory loss, numbness, tingling, or seizures.  PSYCHIATRIC: Denies depression, anxiety, mood swings, and thoughts of suicide.    OBJECTIVE:     /82   Pulse 78   Temp 98.4  F (36.9  C) (Temporal)   Resp 16   Wt 78 kg (172 lb)   SpO2 99%   BMI 26.13 kg/m    Body mass index is 26.13 kg/m .  GENERAL: healthy, alert and no distress  RESP: lungs clear to auscultation - no rales, rhonchi or wheezes  CV: regular rate and rhythm, normal S1 S2, no S3 or S4, no murmur, click or rub, no peripheral edema  NEURO: Normal strength and tone, mentation intact and speech normal. Gait is stable.   PSYCH: mentation appears normal, affect normal/bright    ASSESSMENT/PLAN:       ICD-10-CM    1. Benign essential hypertension I10 lisinopril (PRINIVIL/ZESTRIL) 10 MG tablet     Basic metabolic panel  (Ca, Cl,  CO2, Creat, Gluc, K, Na, BUN)   2. Migraine without aura and without status migrainosus, not intractable G43.009 amitriptyline (ELAVIL) 10 MG tablet     SUMAtriptan (IMITREX) 100 MG tablet   3. Hyperlipidemia LDL goal <160 E78.5 Lipid panel reflex to direct LDL Fasting       1. BP looks great. Will continue current dose of lisinopril and plan on follow up annually. He will complete fasting labs within the next week.    2. Migraines are well controlled on the amitriptyline so will continue current dose with as needed Imitrex.    3. He will come in for a fasting lipid panel within the next week.    Follow up annually.     Jewel Saxena PA-C  [facility]

## 2019-03-04 ENCOUNTER — OFFICE VISIT (OUTPATIENT)
Dept: FAMILY MEDICINE | Facility: OTHER | Age: 44
End: 2019-03-04
Payer: COMMERCIAL

## 2019-03-04 VITALS
TEMPERATURE: 98.4 F | RESPIRATION RATE: 16 BRPM | SYSTOLIC BLOOD PRESSURE: 124 MMHG | BODY MASS INDEX: 26.13 KG/M2 | OXYGEN SATURATION: 99 % | HEART RATE: 78 BPM | DIASTOLIC BLOOD PRESSURE: 82 MMHG | WEIGHT: 172 LBS

## 2019-03-04 DIAGNOSIS — I10 BENIGN ESSENTIAL HYPERTENSION: Primary | ICD-10-CM

## 2019-03-04 DIAGNOSIS — E78.5 HYPERLIPIDEMIA LDL GOAL <160: ICD-10-CM

## 2019-03-04 DIAGNOSIS — G43.009 MIGRAINE WITHOUT AURA AND WITHOUT STATUS MIGRAINOSUS, NOT INTRACTABLE: ICD-10-CM

## 2019-03-04 PROCEDURE — 99214 OFFICE O/P EST MOD 30 MIN: CPT | Performed by: PHYSICIAN ASSISTANT

## 2019-03-04 RX ORDER — AMITRIPTYLINE HYDROCHLORIDE 10 MG/1
TABLET ORAL
Qty: 180 TABLET | Refills: 3 | Status: SHIPPED | OUTPATIENT
Start: 2019-03-04 | End: 2021-04-22

## 2019-03-04 RX ORDER — LISINOPRIL 10 MG/1
10 TABLET ORAL DAILY
Qty: 90 TABLET | Refills: 3 | Status: SHIPPED | OUTPATIENT
Start: 2019-03-04 | End: 2020-03-17

## 2019-03-04 RX ORDER — SUMATRIPTAN 100 MG/1
TABLET, FILM COATED ORAL
Qty: 12 TABLET | Refills: 5 | Status: SHIPPED | OUTPATIENT
Start: 2019-03-04 | End: 2020-03-17

## 2019-03-04 NOTE — PATIENT INSTRUCTIONS
Continue current medications.    Come in for fasting labs within the next few weeks.    Follow up annually.

## 2019-05-09 ENCOUNTER — TELEPHONE (OUTPATIENT)
Dept: FAMILY MEDICINE | Facility: OTHER | Age: 44
End: 2019-05-09

## 2019-05-09 NOTE — TELEPHONE ENCOUNTER
Panel Management Review    Summary:    Patient is due/failing the following:   BMP, LDL and PHYSICAL    Action needed:   Patient needs office visit for Physical and needs fasting lab only appointment    Type of outreach:    Phone, spoke to patient.  Patient currently does not have insurance and will call back to schedule.    Questions for provider review:    None                                                                                                                                    Bridgette Jovel CMA ([facility])       Chart routed to Care Team .      Patient has the following on his problem list:     Hypertension   Last three blood pressure readings:  BP Readings from Last 3 Encounters:   03/04/19 124/82   01/14/19 (!) 140/110   03/12/18 136/80     Blood pressure: Passed    HTN Guidelines:  Less than 140/90      Composite cancer screening  Chart review shows that this patient is due/due soon for the following None

## 2019-05-31 ENCOUNTER — TELEPHONE (OUTPATIENT)
Dept: FAMILY MEDICINE | Facility: OTHER | Age: 44
End: 2019-05-31

## 2019-05-31 DIAGNOSIS — N23 RENAL COLIC: Primary | ICD-10-CM

## 2019-05-31 DIAGNOSIS — Z87.442 HISTORY OF KIDNEY STONES: ICD-10-CM

## 2019-05-31 RX ORDER — TAMSULOSIN HYDROCHLORIDE 0.4 MG/1
0.4 CAPSULE ORAL DAILY
Qty: 20 CAPSULE | Refills: 0 | Status: SHIPPED | OUTPATIENT
Start: 2019-05-31 | End: 2021-04-22

## 2019-05-31 NOTE — TELEPHONE ENCOUNTER
Spoke to patient's wife, says patient has a kidney stone and usually passes them on his own. When he was in the ER for one, they gave him flomax and said he passed it just fine. Stated that he will need an appointment and that he is due for a physical and lab work. They do not have insurance right now. Requesting I send this message to Luisa. Stated that he might need an appt regardless since I don't see that he was ever given this.  Maryse Heaton MA

## 2019-05-31 NOTE — TELEPHONE ENCOUNTER
[facility] phone call message- patient requests medication or medication refill:    If this is a refill request, has the caller requested the refill from the pharmacy already? No  Name of the pharmacy and phone number for the current request:  [facility] [phone]    Name of the medication requested: Flomax    Other request: patient declined calling pharmacy first    OK to leave the result message on voice mail or with a family member? NO    Call taken on 5/31/2019 at 12:52 PM by Crystal Morales

## 2019-05-31 NOTE — TELEPHONE ENCOUNTER
"Talked to patient. He has had \"like 30 kidney stones\" in his life. He feels it moving. He is urinating without difficulty. He has no fever. I told him I would send in the prescription for Flomax but that he assumes the risk of not being fully evaluated. I explained that if the kidney stone is large it can obstruct the ureter and cause hydronephrosis and kidney damage. He verbalized understanding and assumes said risks.  Haily Beaulieu, NP-C    "

## 2019-07-17 ENCOUNTER — TELEPHONE (OUTPATIENT)
Dept: FAMILY MEDICINE | Facility: OTHER | Age: 44
End: 2019-07-17

## 2019-07-17 NOTE — TELEPHONE ENCOUNTER
Panel Management Review      Patient has the following on his problem list:     Hypertension   Last three blood pressure readings:  BP Readings from Last 3 Encounters:   03/04/19 124/82   01/14/19 (!) 140/110   03/12/18 136/80     Blood pressure: FAILED    HTN Guidelines:  Less than 140/90      Composite cancer screening  Chart review shows that this patient is due/due soon for the following None  Summary:    Patient is due/failing the following:   HIV, Lipid, BMP    Action needed:   Patient needs office visit for Annual Physical. and Patient needs fasting lab only appointment    Type of outreach:    Phone, left message for patient to call back.     Questions for provider review:    None                                                                        Lucretia Patricio CMA                                                               Chart routed to Care Team .

## 2020-01-22 ENCOUNTER — IMMUNIZATION (OUTPATIENT)
Dept: FAMILY MEDICINE | Facility: CLINIC | Age: 45
End: 2020-01-22
Payer: MEDICAID

## 2020-01-22 DIAGNOSIS — Z23 NEED FOR PROPHYLACTIC VACCINATION AND INOCULATION AGAINST INFLUENZA: Primary | ICD-10-CM

## 2020-01-22 PROCEDURE — 90471 IMMUNIZATION ADMIN: CPT

## 2020-01-22 PROCEDURE — 90686 IIV4 VACC NO PRSV 0.5 ML IM: CPT

## 2020-01-22 PROCEDURE — 99207 ZZC NO CHARGE NURSE ONLY: CPT

## 2020-03-15 DIAGNOSIS — I10 BENIGN ESSENTIAL HYPERTENSION: ICD-10-CM

## 2020-03-15 DIAGNOSIS — G43.009 MIGRAINE WITHOUT AURA AND WITHOUT STATUS MIGRAINOSUS, NOT INTRACTABLE: ICD-10-CM

## 2020-03-17 RX ORDER — LISINOPRIL 10 MG/1
TABLET ORAL
Qty: 90 TABLET | Refills: 3 | Status: SHIPPED | OUTPATIENT
Start: 2020-03-17 | End: 2021-04-13

## 2020-03-17 RX ORDER — SUMATRIPTAN 100 MG/1
TABLET, FILM COATED ORAL
Qty: 12 TABLET | Refills: 4 | Status: SHIPPED | OUTPATIENT
Start: 2020-03-17 | End: 2021-04-22

## 2020-10-19 DIAGNOSIS — G43.009 MIGRAINE WITHOUT AURA AND WITHOUT STATUS MIGRAINOSUS, NOT INTRACTABLE: ICD-10-CM

## 2020-10-19 RX ORDER — SUMATRIPTAN 100 MG/1
TABLET, FILM COATED ORAL
Qty: 12 TABLET | Refills: 4 | Status: CANCELLED | OUTPATIENT
Start: 2020-10-19

## 2020-10-20 NOTE — TELEPHONE ENCOUNTER
Pending Prescriptions:                       Disp   Refills    SUMAtriptan (IMITREX) 100 MG tablet        12 tab*4        Sig: TAKE ONE TABLET BY MOUTH AT ONSET OF HEADACHE FOR           MIGRAINE. MAY REPEAT DOSE IN 2 HOURS. MAX OF 2           TABS/24HRS.      Routing refill request to provider for review/approval because:  Labs not current:  BP      Ann-Marie Garcia RN BSN     Strep throat / Bronchitis  - rapid strep positive  - Amoxicillin twice a day x 10 days (take with food)  - tessalon pearls  - albuterol inhaler use every 4-6 hours as needed for cp, sob wheezing  - flonase nasal spray  - mucinex  - steam humidified air, netti pot   - warm salt water gargles honey     If any worsening symptoms or no improvement call or return back for a visit    Patient Education     Pharyngitis: Strep (Confirmed)    You have had a positive test for strep throat. Strep throat is a contagious illness. It is spread by coughing, kissing or by touching others after touching your mouth or nose. Symptoms include throat pain that is worse with swallowing, aching all over, headache, and fever. It is treated with antibiotic medicine. This should help you start to feel better in 1 to 2 days.  Home care  · Rest at home. Drink plenty of fluids to you won't get dehydrated.  · No work or school for the first 2 days of taking the antibiotics. After this time, you will not be contagious. You can then return to school or work if you are feeling better.   · Take antibiotic medicine for the full 10 days, even if you feel better. This is very important to ensure the infection is treated. It is also important to prevent medicine-resistant germs from developing. If you were given an antibiotic shot, you don't need any more antibiotics.  · You may use acetaminophen or ibuprofen to control pain or fever, unless another medicine was prescribed for this. Talk with your healthcare provider before taking these medicines if you have chronic liver or kidney disease. Also talk with your healthcare provider if you have had a stomach ulcer or GI bleeding.  · Throat lozenges or sprays help reduce pain. Gargling with warm saltwater will also reduce throat pain. Dissolve 1/2 teaspoon of salt in 1 glass of warm water. This may be useful just before meals.   · Soft foods are OK. Don't eat salty or spicy foods.  Follow-up care  Follow up  with your healthcare provider or our staff if you don't get better over the next week.  When to seek medical advice  Call your healthcare provider right away if any of these occur:  · Fever of 100.4ºF (38ºC) or higher, or as directed by your healthcare provider  · New or worsening ear pain, sinus pain, or headache  · Painful lumps in the back of neck  · Stiff neck  · Lymph nodes getting larger or becoming soft in the middle  · You can't swallow liquids or you can't open your mouth wide because of throat pain  · Signs of dehydration. These include very dark urine or no urine, sunken eyes, and dizziness.  · Trouble breathing or noisy breathing  · Muffled voice  · Rash  Prevention  Here are steps you can take to help prevent an infection:  · Keep good hand washing habits.  · Don’t have close contact with people who have sore throats, colds, or other upper respiratory infections.  · Don’t smoke, and stay away from secondhand smoke.  Date Last Reviewed: 11/1/2017 © 2000-2018 [employer]. [address] [place] [address], [place] [address]. All rights reserved. This information is not intended as a substitute for professional medical care. Always follow your healthcare professional's instructions.

## 2020-10-21 NOTE — TELEPHONE ENCOUNTER
Denied, needs follow-up prior to refills as he has not been seen in 1.5 years.    Jewel Saxena PA-C

## 2020-10-21 NOTE — TELEPHONE ENCOUNTER
Attempted to reach the patient with the following information.  Left message for patient to return call to clinic.     Nesha Cason MA

## 2021-04-13 DIAGNOSIS — I10 BENIGN ESSENTIAL HYPERTENSION: ICD-10-CM

## 2021-04-15 RX ORDER — LISINOPRIL 10 MG/1
10 TABLET ORAL DAILY
Qty: 90 TABLET | Refills: 0 | Status: SHIPPED | OUTPATIENT
Start: 2021-04-15 | End: 2021-04-22

## 2021-04-20 ENCOUNTER — TELEPHONE (OUTPATIENT)
Dept: FAMILY MEDICINE | Facility: OTHER | Age: 46
End: 2021-04-20

## 2021-04-20 NOTE — TELEPHONE ENCOUNTER
Patient is scheduled to see you on Thursday this week but didn't want to forget to inform you that he is due for his colonoscopy he was advised to have one done 3 years  And would like to go to [place] this time...

## 2021-04-20 NOTE — PROGRESS NOTES
Assessment & Plan     ICD-10-CM    1. Migraine without aura and without status migrainosus, not intractable  G43.009 SUMAtriptan (IMITREX) 100 MG tablet     galcanezumab-gnlm (EMGALITY) 120 MG/ML injection   2. Benign essential hypertension  I10 lisinopril (ZESTRIL) 10 MG tablet     Comprehensive metabolic panel (BMP + Alb, Alk Phos, ALT, AST, Total. Bili, TP)   3. Ringworm  B35.9 ketoconazole (NIZORAL) 2 % external cream   4. Screening for HIV (human immunodeficiency virus)  Z11.4 HIV Antigen Antibody Combo   5. Need for hepatitis C screening test  Z11.59 Hepatitis C Screen Reflex to HCV RNA Quant and Genotype   6. Family history of colon cancer  Z80.0 GASTROENTEROLOGY ADULT REF PROCEDURE ONLY   7. History of colonic polyps  Z86.010 GASTROENTEROLOGY ADULT REF PROCEDURE ONLY   8. Colon cancer screening  Z12.11 GASTROENTEROLOGY ADULT REF PROCEDURE ONLY   9. CARDIOVASCULAR SCREENING; LDL GOAL LESS THAN 160  Z13.6 Lipid panel reflex to direct LDL Fasting       1. Patient is interested in trying an injectable preventative migraine medication. He was involved in a research study for Aimovig, but this medication is not in his formulary. Will prescribe Emgality 120mg/mL for preventative migraine relief. Common side effects discussed. If too expensive, can try propranolol. Continue to use imitrex 100mg for abortive migraine therapy. Will follow up in 6 weeks to reassess.     2. Blood pressure was elevated in clinic today at 136/92, repeat was 138/92. Patient has not taken his Lisinopril for 2 days as he needed a refill for it. He reports home BP recordings are lower in the 120-135/80-90 range. Will continue on Lisinopril 10 mg and encouraged patient to keep checking his BP at home. Encouraged healthy diet and routine exercise. If they remain elevated, will need to consider an increase in dose. Will get updated CMP today.     3. Will prescribe ketoconazole 2% topical cream for ringworm. If not improving, contact the  "clinic.     4, 5, 9. Will get updated labs today including lipid panel, CMP, HIV screening, and Hepatitis C screening.     6-8. Due for updated screening colonoscopy so order has been laced.     Follow up in 6 weeks to reassess BP and migraine medication.     Patient seen in conjunction with PEDRO LUIS Edwards.     BERNABE Schwab [facility] EDNA Lott is a 46 year old who presents for the following health issues     History of Present Illness       Hypertension: He presents for follow up of hypertension.  He does check blood pressure  regularly outside of the clinic. Outpatient blood pressures have not been over 140/90. He follows a low salt diet.     Migraines:   Since the patient's last clinic visit, headaches are: no change  The patient is getting headaches:  2-3 times a week  He is not able to do normal daily activities when he has a migraine.  The patient is taking the following rescue/relief medications:  Sumatriptan (Imitrex)   Patient states \"I get some relief\" from the rescue/relief medications.   The patient is taking the following medications to prevent migraines:  No medications to prevent migraines  In the past 4 weeks, the patient has gone to an Urgent Care or Emergency Room 0 times times due to headaches.    He eats 2-3 servings of fruits and vegetables daily.He consumes 1 sweetened beverage(s) daily.He exercises with enough effort to increase his heart rate 10 to 19 minutes per day.    He is taking medications regularly.     Patient presents for follow up on hypertension and migraines. He checks his blood pressure daily at home. He currently has been off of his lisinopril for 2 days due to needing a refill. States he normally runs in the 125-135/85-90 range. He does have a daily exercise regimen and tries to eat healthy. Endorses that migraines are weather induced, experiencing them more in the spring and fall months. States they occur about 2-3 times per " week during these months. He stopped taking the amitriptyline for preventative measures, stating it did not help at all. He has tried topiramate in the past which was not beneficial at preventing migraines either. He reports that Imitrex provides about 80-90% relief from his debilitating migraines. He did participate in a research trial about 4 years ago for Aimovig and reports that he had almost complete resolution of migraines on this medication. He is interested in trying an injectable preventative migraine medication today. Patient also reports that he had ringworm on his right forearm about 1 month ago. He has been using OTC topical treatment, but there is still a patch that will not resolve.       Review of Systems   Constitutional: Negative for activity change, chills and fever.   HENT: Negative for congestion, hearing loss and sinus pain.    Respiratory: Negative for cough and shortness of breath.    Cardiovascular: Negative for chest pain and palpitations.   Gastrointestinal: Negative for abdominal pain, constipation and diarrhea.   Musculoskeletal: Negative for arthralgias and back pain.   Neurological: Positive for headaches. Negative for dizziness and numbness.   Psychiatric/Behavioral: Negative for agitation and confusion.   All other systems reviewed and are negative.          Objective    BP (!) 138/92   Pulse 70   Temp 98.7  F (37.1  C) (Temporal)   Resp 14   Wt 76.2 kg (168 lb)   SpO2 97%   BMI 25.52 kg/m    Body mass index is 25.52 kg/m .  Physical Exam   GENERAL: healthy, alert and no distress  NECK: no adenopathy, no asymmetry, masses, or scars and thyroid normal to palpation  RESP: lungs clear to auscultation - no rales, rhonchi or wheezes  CV: regular rate and rhythm, normal S1 S2, no S3 or S4, no murmur, click or rub, no peripheral edema and peripheral pulses strong  MS: no gross musculoskeletal defects noted, no edema  SKIN: Pinkish, scaly papule of right forearm.   NEURO: Normal  strength and tone, mentation intact and speech normal. Gait is stable.   PSYCH: mentation appears normal, affect normal/bright

## 2021-04-22 ENCOUNTER — OFFICE VISIT (OUTPATIENT)
Dept: FAMILY MEDICINE | Facility: OTHER | Age: 46
End: 2021-04-22
Payer: COMMERCIAL

## 2021-04-22 VITALS
OXYGEN SATURATION: 97 % | SYSTOLIC BLOOD PRESSURE: 138 MMHG | BODY MASS INDEX: 25.52 KG/M2 | RESPIRATION RATE: 14 BRPM | WEIGHT: 168 LBS | DIASTOLIC BLOOD PRESSURE: 92 MMHG | HEART RATE: 70 BPM | TEMPERATURE: 98.7 F

## 2021-04-22 DIAGNOSIS — I10 BENIGN ESSENTIAL HYPERTENSION: ICD-10-CM

## 2021-04-22 DIAGNOSIS — Z11.4 SCREENING FOR HIV (HUMAN IMMUNODEFICIENCY VIRUS): ICD-10-CM

## 2021-04-22 DIAGNOSIS — Z12.11 COLON CANCER SCREENING: ICD-10-CM

## 2021-04-22 DIAGNOSIS — Z86.0100 HISTORY OF COLONIC POLYPS: ICD-10-CM

## 2021-04-22 DIAGNOSIS — Z13.6 CARDIOVASCULAR SCREENING; LDL GOAL LESS THAN 160: ICD-10-CM

## 2021-04-22 DIAGNOSIS — G43.009 MIGRAINE WITHOUT AURA AND WITHOUT STATUS MIGRAINOSUS, NOT INTRACTABLE: Primary | ICD-10-CM

## 2021-04-22 DIAGNOSIS — Z80.0 FAMILY HISTORY OF COLON CANCER: ICD-10-CM

## 2021-04-22 DIAGNOSIS — B35.9 RINGWORM: ICD-10-CM

## 2021-04-22 DIAGNOSIS — Z11.59 NEED FOR HEPATITIS C SCREENING TEST: ICD-10-CM

## 2021-04-22 LAB
ALBUMIN SERPL-MCNC: 4.1 G/DL (ref 3.4–5)
ALP SERPL-CCNC: 89 U/L (ref 40–150)
ALT SERPL W P-5'-P-CCNC: 45 U/L (ref 0–70)
ANION GAP SERPL CALCULATED.3IONS-SCNC: 4 MMOL/L (ref 3–14)
AST SERPL W P-5'-P-CCNC: 25 U/L (ref 0–45)
BILIRUB SERPL-MCNC: 0.6 MG/DL (ref 0.2–1.3)
BUN SERPL-MCNC: 14 MG/DL (ref 7–30)
CALCIUM SERPL-MCNC: 9.2 MG/DL (ref 8.5–10.1)
CHLORIDE SERPL-SCNC: 106 MMOL/L (ref 94–109)
CHOLEST SERPL-MCNC: 239 MG/DL
CO2 SERPL-SCNC: 29 MMOL/L (ref 20–32)
CREAT SERPL-MCNC: 1.11 MG/DL (ref 0.66–1.25)
GFR SERPL CREATININE-BSD FRML MDRD: 78 ML/MIN/{1.73_M2}
GLUCOSE SERPL-MCNC: 92 MG/DL (ref 70–99)
HDLC SERPL-MCNC: 51 MG/DL
LDLC SERPL CALC-MCNC: 148 MG/DL
NONHDLC SERPL-MCNC: 188 MG/DL
POTASSIUM SERPL-SCNC: 4.4 MMOL/L (ref 3.4–5.3)
PROT SERPL-MCNC: 7.7 G/DL (ref 6.8–8.8)
SODIUM SERPL-SCNC: 139 MMOL/L (ref 133–144)
TRIGL SERPL-MCNC: 198 MG/DL

## 2021-04-22 PROCEDURE — 99214 OFFICE O/P EST MOD 30 MIN: CPT | Performed by: PHYSICIAN ASSISTANT

## 2021-04-22 PROCEDURE — 87389 HIV-1 AG W/HIV-1&-2 AB AG IA: CPT | Performed by: PHYSICIAN ASSISTANT

## 2021-04-22 PROCEDURE — 80061 LIPID PANEL: CPT | Performed by: PHYSICIAN ASSISTANT

## 2021-04-22 PROCEDURE — 36415 COLL VENOUS BLD VENIPUNCTURE: CPT | Performed by: PHYSICIAN ASSISTANT

## 2021-04-22 PROCEDURE — 80053 COMPREHEN METABOLIC PANEL: CPT | Performed by: PHYSICIAN ASSISTANT

## 2021-04-22 PROCEDURE — 86803 HEPATITIS C AB TEST: CPT | Performed by: PHYSICIAN ASSISTANT

## 2021-04-22 RX ORDER — LISINOPRIL 10 MG/1
10 TABLET ORAL DAILY
Qty: 90 TABLET | Refills: 3 | Status: SHIPPED | OUTPATIENT
Start: 2021-04-22 | End: 2022-02-24

## 2021-04-22 RX ORDER — KETOCONAZOLE 20 MG/G
CREAM TOPICAL 2 TIMES DAILY
Qty: 15 G | Refills: 0 | Status: SHIPPED | OUTPATIENT
Start: 2021-04-22 | End: 2021-04-29

## 2021-04-22 RX ORDER — SUMATRIPTAN 100 MG/1
TABLET, FILM COATED ORAL
Qty: 12 TABLET | Refills: 4 | Status: SHIPPED | OUTPATIENT
Start: 2021-04-22 | End: 2022-02-24

## 2021-04-22 ASSESSMENT — ENCOUNTER SYMPTOMS
DIARRHEA: 0
CHILLS: 0
HEADACHES: 1
NUMBNESS: 0
ARTHRALGIAS: 0
SINUS PAIN: 0
COUGH: 0
ABDOMINAL PAIN: 0
CONFUSION: 0
BACK PAIN: 0
DIZZINESS: 0
SHORTNESS OF BREATH: 0
AGITATION: 0
CONSTIPATION: 0
ACTIVITY CHANGE: 0
FEVER: 0
PALPITATIONS: 0

## 2021-04-22 NOTE — PATIENT INSTRUCTIONS
Continue with lisinopril and as needed Imitrex.  Will start Emgality to use every 28 days as directed.  If you have concerns or side effects, let me know.    They will call to schedule a colonoscopy.    Follow up in 6 weeks for a recheck.

## 2021-04-23 LAB
HCV AB SERPL QL IA: NONREACTIVE
HIV 1+2 AB+HIV1 P24 AG SERPL QL IA: NONREACTIVE

## 2021-05-25 ENCOUNTER — RECORDS - HEALTHEAST (OUTPATIENT)
Dept: ADMINISTRATIVE | Facility: CLINIC | Age: 46
End: 2021-05-25

## 2021-11-10 DIAGNOSIS — G43.009 MIGRAINE WITHOUT AURA AND WITHOUT STATUS MIGRAINOSUS, NOT INTRACTABLE: ICD-10-CM

## 2021-11-10 RX ORDER — GALCANEZUMAB 120 MG/ML
INJECTION, SOLUTION SUBCUTANEOUS
Qty: 3 ML | Refills: 0 | Status: SHIPPED | OUTPATIENT
Start: 2021-11-10 | End: 2022-02-24

## 2021-11-10 NOTE — TELEPHONE ENCOUNTER
Pending Prescriptions:                       Disp   Refills    EMGALITY 120 MG/ML injection [Pharmacy Med*3 mL   0        Sig: INJECT 1ML SUBCUTANEOUSLY ONCE MONTHLY AFTER           COMPLETEING 2ML INJECTION MONTH    Routing refill request to provider for review/approval because:  Drug not on the [facility] refill protocol     Nicole Munson RN on 11/10/2021 at 12:49 PM

## 2022-02-03 ENCOUNTER — TELEPHONE (OUTPATIENT)
Dept: GASTROENTEROLOGY | Facility: CLINIC | Age: 47
End: 2022-02-03
Payer: COMMERCIAL

## 2022-02-03 NOTE — TELEPHONE ENCOUNTER
Screening Questions  Blue=prep questions Red=location Green=sedation   1. Are you active on mychart? N- LINK SENT; ADDRESS CURRENT    2. What insurance is in the chart? [facility]   ID [id number]  GROUP [id number]  PAYOR  SELF EMPLOYED   3.  Ordering/Referring Provider: Jewel Saxena PA-C    4. BMI 25.5, If greater than 40 review exclusion criteria also will need EXTENDED PREP    5.  Respiratory Screening (If yes to any of the following HOSPITAL setting only):  Do you use daily home oxygen? N  Do you have mod to severe Obstructive Sleep Apnea? N (can be seen at [facility] or hospital setting)    Do you have Pulmonary Hypertension? N   Do you have UNCONTROLLED asthma? N    6. Have you had a heart or lung transplant? N  (If yes, please review exclusion criteria)    7. Are you currently on dialysis?N  (If yes, schedule in HOSPITAL setting only)(If yes, please send Golytely prep)    8. Do you have chronic kidney disease? N (If yes, please send Golytely prep)    9. Have you had a stroke or Transient ischemic attack (TIA) within 6 months? N (If yes, do not schedule at [facility])    10. In the past 6 months, have you had any heart related issues including cardiomyopathy or heart attack? N (If yes, please review exclusion criteria)           If yes, did it require cardiac stenting or other implantable device?N  (If yes, please review exclusion criteria)      11. Do you have any implantable devices in your body (pacemaker, defib, LVAD)? N (If yes, schedule at [facility])    12. Do you take nitroglycerin? If yes, how often? N (if yes, schedule at HOSPITAL setting)    13. Are you currently taking any blood thinners?N (If yes- inform patient to follow up with PCP or provider for follow up instructions)     14. Are you a diabetic? N (If yes, please send Golytely prep)    15. (Females) Are you currently pregnant? N  If yes, how many weeks?      16. Are you taking any prescription pain medications on a routine schedule? MPREATIEN MIGARIN If yes, MAC  sedation and patient will need EXTENDED PREP.    17. Do you have any chemical dependencies such as alcohol, street drugs, or methadone? N If yes, MAC sedation     18. Do you have any history of post-traumatic stress syndrome, severe anxiety or history of psychosis? N  If yes, MAC sedation.     19. Do you transfer independently? Y    20.  Do you have any issues with constipation? N   If yes, pt will need EXTENDED PREP     21. Preferred Pharmacy for Pre Prescription [facility]    Scheduling Details    Which Colonoscopy Prep was Sent?: Mprep  Type of Procedure Scheduled: colon  Surgeon: JERROD  Date of Procedure: 03/11 1115AM  Location:   Caller (Please ask for phone number if not scheduled by patient): Oren      Sedation Type: CS  Conscious Sedation- Needs  for 6 hours after the procedure  MAC/General-Needs  for 24 hours after procedure    Pre-op Required at [facility], [place], [place] and OR for MAC sedation: N  (if yes advise patient they will need a pre-op prior to procedure)      Informed patient they will need an adult  Y  Cannot take any type of public or medical transportation alone    Pre-Procedure Covid test to be completed at [facility] or Externally: MG 03/07 1030A    Confirmed Nurse will call to complete assessment Y    Additional comments:

## 2022-02-11 DIAGNOSIS — Z11.59 ENCOUNTER FOR SCREENING FOR OTHER VIRAL DISEASES: Primary | ICD-10-CM

## 2022-02-22 DIAGNOSIS — G43.009 MIGRAINE WITHOUT AURA AND WITHOUT STATUS MIGRAINOSUS, NOT INTRACTABLE: ICD-10-CM

## 2022-02-23 RX ORDER — PROCHLORPERAZINE MALEATE 10 MG
10 TABLET ORAL EVERY 6 HOURS PRN
Status: CANCELLED | OUTPATIENT
Start: 2022-02-23

## 2022-02-23 RX ORDER — NALOXONE HYDROCHLORIDE 0.4 MG/ML
0.2 INJECTION, SOLUTION INTRAMUSCULAR; INTRAVENOUS; SUBCUTANEOUS
Status: CANCELLED | OUTPATIENT
Start: 2022-02-23

## 2022-02-23 RX ORDER — NALOXONE HYDROCHLORIDE 0.4 MG/ML
0.4 INJECTION, SOLUTION INTRAMUSCULAR; INTRAVENOUS; SUBCUTANEOUS
Status: CANCELLED | OUTPATIENT
Start: 2022-02-23

## 2022-02-23 RX ORDER — FLUMAZENIL 0.1 MG/ML
0.2 INJECTION, SOLUTION INTRAVENOUS
Status: CANCELLED | OUTPATIENT
Start: 2022-02-23 | End: 2022-02-24

## 2022-02-23 RX ORDER — ONDANSETRON 4 MG/1
4 TABLET, ORALLY DISINTEGRATING ORAL EVERY 6 HOURS PRN
Status: CANCELLED | OUTPATIENT
Start: 2022-02-23

## 2022-02-23 RX ORDER — ONDANSETRON 2 MG/ML
4 INJECTION INTRAMUSCULAR; INTRAVENOUS EVERY 6 HOURS PRN
Status: CANCELLED | OUTPATIENT
Start: 2022-02-23

## 2022-02-23 RX ORDER — GALCANEZUMAB 120 MG/ML
INJECTION, SOLUTION SUBCUTANEOUS
Qty: 3 ML | Refills: 0 | OUTPATIENT
Start: 2022-02-23

## 2022-02-23 NOTE — TELEPHONE ENCOUNTER
Needs to schedule a follow-up prior to refills. Was supposed to follow up in 6 weeks for his BP and migraines after his visit last April. Okay to use approval only spot.    Jewel Saxena PA-C

## 2022-02-24 ENCOUNTER — OFFICE VISIT (OUTPATIENT)
Dept: FAMILY MEDICINE | Facility: OTHER | Age: 47
End: 2022-02-24
Payer: COMMERCIAL

## 2022-02-24 VITALS
RESPIRATION RATE: 20 BRPM | WEIGHT: 166 LBS | DIASTOLIC BLOOD PRESSURE: 80 MMHG | TEMPERATURE: 97.7 F | SYSTOLIC BLOOD PRESSURE: 100 MMHG | OXYGEN SATURATION: 98 % | BODY MASS INDEX: 25.16 KG/M2 | HEART RATE: 76 BPM | HEIGHT: 68 IN

## 2022-02-24 DIAGNOSIS — I10 BENIGN ESSENTIAL HYPERTENSION: ICD-10-CM

## 2022-02-24 DIAGNOSIS — R79.89 LIVER FUNCTION TEST ABNORMALITY: ICD-10-CM

## 2022-02-24 DIAGNOSIS — Z13.6 CARDIOVASCULAR SCREENING; LDL GOAL LESS THAN 160: ICD-10-CM

## 2022-02-24 DIAGNOSIS — Z80.0 FAMILY HISTORY OF COLON CANCER: ICD-10-CM

## 2022-02-24 DIAGNOSIS — G43.009 MIGRAINE WITHOUT AURA AND WITHOUT STATUS MIGRAINOSUS, NOT INTRACTABLE: Primary | ICD-10-CM

## 2022-02-24 LAB
ALBUMIN SERPL-MCNC: 3.7 G/DL (ref 3.4–5)
ALP SERPL-CCNC: 98 U/L (ref 40–150)
ALT SERPL W P-5'-P-CCNC: 47 U/L (ref 0–70)
ANION GAP SERPL CALCULATED.3IONS-SCNC: 4 MMOL/L (ref 3–14)
AST SERPL W P-5'-P-CCNC: 26 U/L (ref 0–45)
BILIRUB SERPL-MCNC: 0.4 MG/DL (ref 0.2–1.3)
BUN SERPL-MCNC: 15 MG/DL (ref 7–30)
CALCIUM SERPL-MCNC: 9.1 MG/DL (ref 8.5–10.1)
CHLORIDE BLD-SCNC: 107 MMOL/L (ref 94–109)
CHOLEST SERPL-MCNC: 198 MG/DL
CO2 SERPL-SCNC: 29 MMOL/L (ref 20–32)
CREAT SERPL-MCNC: 1.08 MG/DL (ref 0.66–1.25)
FASTING STATUS PATIENT QL REPORTED: NO
GFR SERPL CREATININE-BSD FRML MDRD: 84 ML/MIN/1.73M2
GGT SERPL-CCNC: 100 U/L (ref 0–75)
GLUCOSE BLD-MCNC: 103 MG/DL (ref 70–99)
HDLC SERPL-MCNC: 41 MG/DL
LDLC SERPL CALC-MCNC: 114 MG/DL
NONHDLC SERPL-MCNC: 157 MG/DL
POTASSIUM BLD-SCNC: 4.5 MMOL/L (ref 3.4–5.3)
PROT SERPL-MCNC: 7.1 G/DL (ref 6.8–8.8)
SODIUM SERPL-SCNC: 140 MMOL/L (ref 133–144)
TRIGL SERPL-MCNC: 216 MG/DL

## 2022-02-24 PROCEDURE — 99214 OFFICE O/P EST MOD 30 MIN: CPT | Performed by: PHYSICIAN ASSISTANT

## 2022-02-24 PROCEDURE — 82977 ASSAY OF GGT: CPT | Performed by: PHYSICIAN ASSISTANT

## 2022-02-24 PROCEDURE — 36415 COLL VENOUS BLD VENIPUNCTURE: CPT | Performed by: PHYSICIAN ASSISTANT

## 2022-02-24 PROCEDURE — 80061 LIPID PANEL: CPT | Performed by: PHYSICIAN ASSISTANT

## 2022-02-24 PROCEDURE — 80053 COMPREHEN METABOLIC PANEL: CPT | Performed by: PHYSICIAN ASSISTANT

## 2022-02-24 RX ORDER — GALCANEZUMAB 120 MG/ML
INJECTION, SOLUTION SUBCUTANEOUS
Qty: 3 ML | Refills: 3 | Status: SHIPPED | OUTPATIENT
Start: 2022-02-24 | End: 2023-03-07

## 2022-02-24 RX ORDER — SUMATRIPTAN 100 MG/1
TABLET, FILM COATED ORAL
Qty: 12 TABLET | Refills: 5 | Status: SHIPPED | OUTPATIENT
Start: 2022-02-24 | End: 2023-03-10

## 2022-02-24 RX ORDER — LISINOPRIL 10 MG/1
10 TABLET ORAL DAILY
Qty: 90 TABLET | Refills: 3 | Status: SHIPPED | OUTPATIENT
Start: 2022-02-24 | End: 2023-03-10

## 2022-02-24 NOTE — PROGRESS NOTES
"  Assessment & Plan       ICD-10-CM    1. Migraine without aura and without status migrainosus, not intractable  G43.009 galcanezumab-gnlm (EMGALITY) 120 MG/ML injection     SUMAtriptan (IMITREX) 100 MG tablet   2. Benign essential hypertension  I10 lisinopril (ZESTRIL) 10 MG tablet     Comprehensive metabolic panel (BMP + Alb, Alk Phos, ALT, AST, Total. Bili, TP)     CANCELED: Basic metabolic panel  (Ca, Cl, CO2, Creat, Gluc, K, Na, BUN)   3. Liver function test abnormality  R94.5 GGT   4. Family history of colon cancer  Z80.0    5. CARDIOVASCULAR SCREENING; LDL GOAL LESS THAN 160  Z13.6 Lipid panel reflex to direct LDL Fasting       1. Migraines much improved on the monthly Emgality so he will continue current dose along with as needed Imitirex.    2. Continue lisinopril. If he notices more frequent lightheadedness and systolic pressure is going below 100, okay to decrease to 5 mg daily.    3. He states he had a liver lab abnormality on recent life insurance testing and thinks it was GGT so will check this along with a CMP. He drinks socially but no heavily. Normal hepatitis C screening last year.    4. He has a colonoscopy next month.    5. Non-fasting lipid panel ordered.    Follow up annually.    Jewel Saxena PA-C  [facility] is a 47 year [facility] who presents for the following health issues:    History of Present Illness       Migraines:   Since the patient's last clinic visit, headaches are: improved  The patient is getting headaches:  1-4 a month  He is able to do normal daily activities when he has a migraine.  The patient is taking the following rescue/relief medications:  Sumatriptan (Imitrex)   Patient states \"I get some relief\" from the rescue/relief medications.   The patient is taking the following medications to prevent migraines:  Other  In the past 4 weeks, the patient has gone to an Urgent Care or Emergency Room 0 times times due to headaches.    He " "eats 2-3 servings of fruits and vegetables daily.He consumes 1 sweetened beverage(s) daily.He exercises with enough effort to increase his heart rate 20 to 29 minutes per day.  He exercises with enough effort to increase his heart rate 3 or less days per week.   He is taking medications regularly.       His migraines have significantly improved on the monthly Emgality so he is very happy with the results. He continues with Imitrex as needed when migraines occur.     He states he had updated life insurance testing a few months ago and his rates doubled because of an elevated liver test. He states it was not a test they usually check. He denies frequent alcohol use.     His blood pressures have been much improved and he has noted occasional lightheadedness. He usually runs around 105/80.     Review of Systems   Constitutional, HEENT, cardiovascular, pulmonary, gi and gu systems are negative, except as otherwise noted.      Objective    /80   Pulse 76   Temp 97.7  F (36.5  C) (Temporal)   Resp 20   Ht 1.73 m (5' 8.11\")   Wt 75.3 kg (166 lb)   SpO2 98%   BMI 25.16 kg/m    Body mass index is 25.16 kg/m .  Physical Exam   GENERAL: healthy, alert and no distress  RESP: lungs clear to auscultation - no rales, rhonchi or wheezes  CV: regular rate and rhythm, normal S1 S2, no S3 or S4, no murmur, click or rub, no peripheral edema and peripheral pulses strong  NEURO: Normal strength and tone, mentation intact and speech normal. Gait is stable.  PSYCH: mentation appears normal, affect normal/bright        "

## 2022-03-07 ENCOUNTER — LAB (OUTPATIENT)
Dept: LAB | Facility: CLINIC | Age: 47
End: 2022-03-07
Payer: COMMERCIAL

## 2022-03-07 DIAGNOSIS — Z11.59 ENCOUNTER FOR SCREENING FOR OTHER VIRAL DISEASES: ICD-10-CM

## 2022-03-07 PROCEDURE — U0005 INFEC AGEN DETEC AMPLI PROBE: HCPCS

## 2022-03-07 PROCEDURE — U0003 INFECTIOUS AGENT DETECTION BY NUCLEIC ACID (DNA OR RNA); SEVERE ACUTE RESPIRATORY SYNDROME CORONAVIRUS 2 (SARS-COV-2) (CORONAVIRUS DISEASE [COVID-19]), AMPLIFIED PROBE TECHNIQUE, MAKING USE OF HIGH THROUGHPUT TECHNOLOGIES AS DESCRIBED BY CMS-2020-01-R: HCPCS

## 2022-03-08 LAB — SARS-COV-2 RNA RESP QL NAA+PROBE: NEGATIVE

## 2022-03-11 ENCOUNTER — HOSPITAL ENCOUNTER (OUTPATIENT)
Facility: AMBULATORY SURGERY CENTER | Age: 47
Discharge: HOME | End: 2022-03-11
Attending: INTERNAL MEDICINE | Admitting: INTERNAL MEDICINE
Payer: COMMERCIAL

## 2022-03-11 VITALS
OXYGEN SATURATION: 97 % | RESPIRATION RATE: 16 BRPM | TEMPERATURE: 97.5 F | DIASTOLIC BLOOD PRESSURE: 100 MMHG | SYSTOLIC BLOOD PRESSURE: 131 MMHG | HEART RATE: 77 BPM

## 2022-03-11 LAB — COLONOSCOPY: NORMAL

## 2022-03-11 PROCEDURE — 88305 TISSUE EXAM BY PATHOLOGIST: CPT | Performed by: PATHOLOGY

## 2022-03-11 PROCEDURE — 45380 COLONOSCOPY AND BIOPSY: CPT | Mod: XS

## 2022-03-11 PROCEDURE — 45385 COLONOSCOPY W/LESION REMOVAL: CPT

## 2022-03-11 PROCEDURE — G8918 PT W/O PREOP ORDER IV AB PRO: HCPCS

## 2022-03-11 PROCEDURE — G8907 PT DOC NO EVENTS ON DISCHARG: HCPCS

## 2022-03-11 RX ORDER — ONDANSETRON 2 MG/ML
4 INJECTION INTRAMUSCULAR; INTRAVENOUS
Status: DISCONTINUED | OUTPATIENT
Start: 2022-03-11 | End: 2022-03-12 | Stop reason: HOSPADM

## 2022-03-11 RX ORDER — LIDOCAINE 40 MG/G
CREAM TOPICAL
Status: DISCONTINUED | OUTPATIENT
Start: 2022-03-11 | End: 2022-03-12 | Stop reason: HOSPADM

## 2022-03-11 RX ORDER — FENTANYL CITRATE 50 UG/ML
INJECTION, SOLUTION INTRAMUSCULAR; INTRAVENOUS PRN
Status: DISCONTINUED | OUTPATIENT
Start: 2022-03-11 | End: 2022-03-11 | Stop reason: HOSPADM

## 2022-03-11 NOTE — H&P
[facility] Anesthesia Pre-op History and Physical    Oren Solano MRN# [id number]   Age: 47 year old YOB: 1975            Date of Exam 3/11/2022         Primary care provider: Jewel Saxena         Chief Complaint and/or Reason for Procedure:     History of colon polyps         Active problem list:     Patient Active Problem List    Diagnosis Date Noted     Calculus of kidney 11/09/2016     Priority: Medium     Migraine without aura and without status migrainosus, not intractable 11/02/2016     Priority: Medium     Benign essential hypertension 11/02/2016     Priority: Medium     Family history of colon cancer 11/02/2016     Priority: Medium     Renal stones 11/02/2016     Priority: Medium            Medications (include herbals and vitamins):   Any Plavix use in the last 7 days? No     Current Outpatient Medications   Medication Sig     galcanezumab-gnlm (EMGALITY) 120 MG/ML injection INJECT 1ML SUBCUTANEOUSLY ONCE MONTHLY     lisinopril (ZESTRIL) 10 MG tablet Take 1 tablet (10 mg) by mouth daily     magnesium 100 MG CAPS      Multiple Vitamins-Minerals (MULTIVITAMIN ADULT PO)      SUMAtriptan (IMITREX) 100 MG tablet TAKE ONE TABLET BY MOUTH AT ONSET OF HEADACHE FOR MIGRAINE. MAY REPEAT DOSE IN 2 HOURS. MAX OF 2 TABS/24HRS.     Current Facility-Administered Medications   Medication     lidocaine (LMX4) kit     lidocaine 1 % 0.1-1 mL     ondansetron (ZOFRAN) injection 4 mg     sodium chloride (PF) 0.9% PF flush 3 mL     sodium chloride (PF) 0.9% PF flush 3 mL             Allergies:      Allergies   Allergen Reactions     Amoxicillin GI Disturbance     Seasonal Allergies      Allergy to Latex? No  Allergy to tape?   No  Intolerances:             Physical Exam:   All vitals have been reviewed  Patient Vitals for the past 8 hrs:   BP Temp Temp src Resp SpO2   03/11/22 1123 (!) 135/97 97.3  F (36.3  C) Temporal 16 99 %     No intake/output data recorded.  Lungs:   No increased work of  breathing, good air exchange, clear to auscultation bilaterally, no crackles or wheezing     Cardiovascular:   normal S1 and S2             Lab / Radiology Results:            Anesthetic risk and/or ASA classification:     2  Zamzam Samayoa DO

## 2022-03-15 LAB
PATH REPORT.COMMENTS IMP SPEC: NORMAL
PATH REPORT.COMMENTS IMP SPEC: NORMAL
PATH REPORT.FINAL DX SPEC: NORMAL
PATH REPORT.GROSS SPEC: NORMAL
PATH REPORT.MICROSCOPIC SPEC OTHER STN: NORMAL
PATH REPORT.RELEVANT HX SPEC: NORMAL
PHOTO IMAGE: NORMAL

## 2022-03-20 ENCOUNTER — HEALTH MAINTENANCE LETTER (OUTPATIENT)
Age: 47
End: 2022-03-20

## 2022-04-28 ENCOUNTER — TELEPHONE (OUTPATIENT)
Dept: FAMILY MEDICINE | Facility: OTHER | Age: 47
End: 2022-04-28
Payer: COMMERCIAL

## 2022-05-03 NOTE — TELEPHONE ENCOUNTER
PA Initiation    Medication: galcanezumab-gnlm (EMGALITY) 120 MG/ML injection  Insurance Company: [employer] - Phone [phone] Fax [phone]  Pharmacy Filling the Rx: [facility] [address] [place] [address] [id number]  Filling Pharmacy Phone: [phone]  Filling Pharmacy Fax: [phone]  Start Date: 5/3/2022

## 2022-05-03 NOTE — TELEPHONE ENCOUNTER
Prior Authorization Approval    Authorization Effective Date: 4/3/2022  Authorization Expiration Date: 5/3/2023  Medication: galcanezumab-gnlm (EMGALITY) 120 MG/ML injection  Approved Dose/Quantity:   Reference #: [id number]   Insurance Company: [employer] - Phone [phone] Fax [phone]  Which Pharmacy is filling the prescription (Not needed for infusion/clinic administered): [facility] [address] [place] [address] [address]  Pharmacy Notified: Yes  Patient Notified: Yes       Nsaids Counseling: NSAID Counseling: I discussed with the patient that NSAIDs should be taken with food. Prolonged use of NSAIDs can result in the development of stomach ulcers.  Patient advised to stop taking NSAIDs if abdominal pain occurs.  The patient verbalized understanding of the proper use and possible adverse effects of NSAIDs.  All of the patient's questions and concerns were addressed.

## 2022-08-05 ENCOUNTER — TELEPHONE (OUTPATIENT)
Dept: FAMILY MEDICINE | Facility: OTHER | Age: 47
End: 2022-08-05

## 2022-08-05 NOTE — TELEPHONE ENCOUNTER
Reason for Call:  Appointment Request    Patient requesting this type of appt:  Right hip pain    Requested provider: Jewel Saxena    Reason patient unable to be scheduled: Not within requested timeframe    When does patient want to be seen/preferred time: 3-7 days    Comments: Patient is requesting to be fit in to see Marcin Saxena next week to get a referral for possible MRI of his hip    Could we send this information to you in [facility] or would you prefer to receive a phone call?:   Patient would prefer a phone call   Okay to leave a detailed message?: Yes at Cell number on file:    Telephone Information:   Mobile [phone]       Call taken on 8/5/2022 at 2:51 PM by Rola House

## 2022-08-08 ENCOUNTER — ANCILLARY PROCEDURE (OUTPATIENT)
Dept: GENERAL RADIOLOGY | Facility: OTHER | Age: 47
End: 2022-08-08
Attending: PHYSICIAN ASSISTANT
Payer: COMMERCIAL

## 2022-08-08 ENCOUNTER — OFFICE VISIT (OUTPATIENT)
Dept: FAMILY MEDICINE | Facility: OTHER | Age: 47
End: 2022-08-08

## 2022-08-08 VITALS
DIASTOLIC BLOOD PRESSURE: 86 MMHG | RESPIRATION RATE: 16 BRPM | SYSTOLIC BLOOD PRESSURE: 130 MMHG | BODY MASS INDEX: 24.86 KG/M2 | HEIGHT: 68 IN | HEART RATE: 80 BPM | TEMPERATURE: 98.9 F | OXYGEN SATURATION: 98 % | WEIGHT: 164 LBS

## 2022-08-08 DIAGNOSIS — M25.551 HIP PAIN, RIGHT: ICD-10-CM

## 2022-08-08 DIAGNOSIS — R20.2 RIGHT LEG PARESTHESIAS: ICD-10-CM

## 2022-08-08 DIAGNOSIS — M25.551 HIP PAIN, RIGHT: Primary | ICD-10-CM

## 2022-08-08 PROCEDURE — 99214 OFFICE O/P EST MOD 30 MIN: CPT | Performed by: PHYSICIAN ASSISTANT

## 2022-08-08 PROCEDURE — 73502 X-RAY EXAM HIP UNI 2-3 VIEWS: CPT | Mod: TC | Performed by: RADIOLOGY

## 2022-08-08 PROCEDURE — 72100 X-RAY EXAM L-S SPINE 2/3 VWS: CPT | Mod: TC | Performed by: RADIOLOGY

## 2022-08-08 RX ORDER — CYCLOBENZAPRINE HCL 5 MG
5-10 TABLET ORAL 3 TIMES DAILY PRN
Qty: 30 TABLET | Refills: 1 | Status: SHIPPED | OUTPATIENT
Start: 2022-08-08 | End: 2023-03-10

## 2022-08-08 ASSESSMENT — PAIN SCALES - GENERAL: PAINLEVEL: MILD PAIN (3)

## 2022-08-08 NOTE — PATIENT INSTRUCTIONS
Will order a right hip and low back x-ray for further evaluation.  Will prescribe Flexeril to use as needed for muscle tightness. This can make you tired.  Use ibuprofen, Tylenol, lidocaine cream/patches, home stretching and rest.  Will consider MRI or referral to a specialist.  If not improving, let me know.

## 2022-08-08 NOTE — PROGRESS NOTES
Assessment & Plan       ICD-10-CM    1. Hip pain, right  M25.551 XR Hip Right 2-3 Views     XR Lumbar Spine 2/3 Views     cyclobenzaprine (FLEXERIL) 5 MG tablet   2. Right leg paresthesias  R20.2 XR Hip Right 2-3 Views     XR Lumbar Spine 2/3 Views     cyclobenzaprine (FLEXERIL) 5 MG tablet         1-2. Increased right hip pain over the past 2 weeks due to moving boxes/heavy items. He also has right leg paresthesias so will order x-rays of his right hip and lumbar spine for further evaluation. Depending on results, may need to consider an MRIs of his hip and/or lumbar spine and potential referral to orthopedics and/or physical therapy. In the meantime, will prescribe Flexeril to help with muscle tightness and sleep due to the discomfort. I recommend Tylenol, ibuprofen, ice, heat, home stretching and lidocaine cream/patches. If he has any new/worsening symptoms, he will contact the clinic.         Jewel Saxena PA-C  [facility] EDNA Lott is a 47 year old accompanied by his spouse, presenting for the following health issues:  Musculoskeletal Problem (Hip pain)      History of Present Illness       Reason for visit:  Hip pain  Symptom onset:  1-2 weeks ago  Symptoms include:  General hip pain in right hip. It get worse as the day goes on. Some numbness when I sit.  Symptom intensity:  Moderate  Symptom progression:  Worsening  Had these symptoms before:  No  What makes it worse:  Sitting standing or walking  What makes it better:  Laying on my back    He eats 2-3 servings of fruits and vegetables daily.He consumes 1 sweetened beverage(s) daily.He exercises with enough effort to increase his heart rate 9 or less minutes per day.  He exercises with enough effort to increase his heart rate 3 or less days per week.   He is taking medications regularly.     He recently bought a house in [place] so has been moving a lot of boxes/items and has started to notice pain in his right  "hip over the past 2 weeks. It seems to be \"in the joint\" in the front of the hip. He is also noticing intermittent numbness and tingling in his anterior thigh and dorsal foot. He denies any low back pain or pain shooting into the leg. No loss of bowel/bladder control. There has not been anything over the counter that improves the pain. He has never had pain like this in the past.     Review of Systems   Constitutional, cardiovascular, pulmonary, musculoskeletal, neuro, gi and gu systems are negative, except as otherwise noted.      Objective    /86 (BP Location: Right arm, Patient Position: Sitting, Cuff Size: Adult Regular)   Pulse 80   Temp 98.9  F (37.2  C) (Temporal)   Resp 16   Ht 1.727 m (5' 8\")   Wt 74.4 kg (164 lb)   SpO2 98%   BMI 24.94 kg/m    Body mass index is 24.94 kg/m .  Physical Exam   GENERAL: healthy, alert and no distress  MS: no gross musculoskeletal defects noted, no edema. No spinal or hip tenderness. Increased anterolateral right hip pain with internal rotation. Full hip range of motion.   NEURO: Normal strength and tone, mentation intact and speech normal. Cranial nerves II-XII are grossly intact. DTRs are 2+/4 throughout and symmetric. Straight leg raise negative bilaterally. Gait is stable.   PSYCH: mentation appears normal, affect normal/bright    "

## 2022-08-09 DIAGNOSIS — M51.369 DDD (DEGENERATIVE DISC DISEASE), LUMBAR: ICD-10-CM

## 2022-08-09 DIAGNOSIS — R20.2 RIGHT LEG PARESTHESIAS: Primary | ICD-10-CM

## 2022-08-16 ENCOUNTER — ANCILLARY PROCEDURE (OUTPATIENT)
Dept: MRI IMAGING | Facility: CLINIC | Age: 47
End: 2022-08-16
Attending: PHYSICIAN ASSISTANT
Payer: COMMERCIAL

## 2022-08-16 DIAGNOSIS — M51.369 DDD (DEGENERATIVE DISC DISEASE), LUMBAR: ICD-10-CM

## 2022-08-16 DIAGNOSIS — R20.2 RIGHT LEG PARESTHESIAS: ICD-10-CM

## 2022-08-16 PROCEDURE — 72148 MRI LUMBAR SPINE W/O DYE: CPT | Mod: GC | Performed by: RADIOLOGY

## 2022-08-17 NOTE — RESULT ENCOUNTER NOTE
Oren,    Your regular provider is out of the clinic currently.  Your MRI does show some disc protrusion and arthritis.  I would recommend a trial of physical therapy to help with your symptoms first.  If this is not effective, we could consider more aggressive interventions.  Let us know if you would like to proceed with that.    Have a nice day!    Dr. Garcia

## 2022-09-11 ENCOUNTER — HEALTH MAINTENANCE LETTER (OUTPATIENT)
Age: 47
End: 2022-09-11

## 2023-03-09 NOTE — PROGRESS NOTES
Oren is a 48 year old who is being evaluated via a billable telephone visit.      What phone number would you like to be contacted at? [phone]    How would you like to obtain your AVS? Salvatore  Distant Location (provider location):  Off-site    Assessment & Plan       ICD-10-CM    1. Migraine without aura and without status migrainosus, not intractable  G43.009 galcanezumab-gnlm (EMGALITY) 120 MG/ML injection     SUMAtriptan (IMITREX) 100 MG tablet      2. Benign essential hypertension  I10 lisinopril (ZESTRIL) 10 MG tablet      3. Panic attack  F41.0 hydrOXYzine (ATARAX) 25 MG tablet      4. Kidney stone  N20.0           1. His migraines remain well controlled on the current medications so will not make in changes. Will plan on recheck in 6 months for an annual physical.    2. Blood pressure well controlled on lisinopril so will continue current dose.    3. He was seen in the ED last month in [place] for chest pain and it ended up being a panic attack. We discussed treatment for these episodes and he would like an as needed medication rather than a daily medication. Will try hydroxyzine to use as needed for severe anxiety/panic attacks. Common side effects discussed. If symptoms worsens or become more frequent, he will let me know. Will recheck in 6 months.     4. He states he recently passed a kidney stone as it is common for him to pass 2-3 per year. This is likely the reason for his slightly elevated creatinine last month so no need to recheck this urgently. I recommend he push the fluids and will check labs at his [facility] visit.     BERNABE Schwab [facility]   Oren is a 48 year old male, presenting for the following health issues:  Recheck Medication      History of Present Illness       Reason for visit:  Migraine medication    He eats 2-3 servings of fruits and vegetables daily.He consumes 2 sweetened beverage(s) daily.He exercises with enough effort  to increase his heart rate 10 to 19 minutes per day.  He exercises with enough effort to increase his heart rate 5 days per week.   He is taking medications regularly.     His migraines remains well controlled on the Emgality injections with as needed Imitrex.    He was seen last month in the [place] ED for chest pain. He had a negative cardiac work up and was told he experienced a panic attack. He states they are getting ready to move and there are some other life stressors as well so he has been more stressed/anxious lately. He does not have daily anxiety but states it is more situational and it runs in his family. He would be interested in discussing an as needed medication for severe anxiety/panic attacks.     Review of Systems   Constitutional, cardiovascular, pulmonary, neuro, psych, gi and gu systems are negative, except as otherwise noted.      Objective         Vitals:  No vitals were obtained today due to virtual visit.    Physical Exam   healthy, alert and no distress  PSYCH: Alert and oriented times 3; coherent speech, normal   rate and volume, able to articulate logical thoughts, able   to abstract reason, no tangential thoughts, no hallucinations   or delusions. His affect is normal.  RESP: No cough, no audible wheezing, able to talk in full sentences  Remainder of exam unable to be completed due to telephone visits        Phone call duration: 11 minutes

## 2023-03-10 ENCOUNTER — VIRTUAL VISIT (OUTPATIENT)
Dept: FAMILY MEDICINE | Facility: OTHER | Age: 48
End: 2023-03-10
Payer: COMMERCIAL

## 2023-03-10 DIAGNOSIS — F41.0 PANIC ATTACK: ICD-10-CM

## 2023-03-10 DIAGNOSIS — I10 BENIGN ESSENTIAL HYPERTENSION: ICD-10-CM

## 2023-03-10 DIAGNOSIS — G43.009 MIGRAINE WITHOUT AURA AND WITHOUT STATUS MIGRAINOSUS, NOT INTRACTABLE: Primary | ICD-10-CM

## 2023-03-10 DIAGNOSIS — N20.0 KIDNEY STONE: ICD-10-CM

## 2023-03-10 PROCEDURE — 99442 PR PHYSICIAN TELEPHONE EVALUATION 11-20 MIN: CPT | Mod: 93 | Performed by: PHYSICIAN ASSISTANT

## 2023-03-10 RX ORDER — HYDROXYZINE HYDROCHLORIDE 25 MG/1
12.5-25 TABLET, FILM COATED ORAL 3 TIMES DAILY PRN
Qty: 30 TABLET | Refills: 1 | Status: SHIPPED | OUTPATIENT
Start: 2023-03-10 | End: 2023-08-14

## 2023-03-10 RX ORDER — SUMATRIPTAN 100 MG/1
TABLET, FILM COATED ORAL
Qty: 12 TABLET | Refills: 2 | Status: SHIPPED | OUTPATIENT
Start: 2023-03-10 | End: 2023-10-18

## 2023-03-10 RX ORDER — GALCANEZUMAB 120 MG/ML
INJECTION, SOLUTION SUBCUTANEOUS
Qty: 3 ML | Refills: 1 | Status: SHIPPED | OUTPATIENT
Start: 2023-03-10

## 2023-03-10 RX ORDER — LISINOPRIL 10 MG/1
10 TABLET ORAL DAILY
Qty: 90 TABLET | Refills: 1 | Status: SHIPPED | OUTPATIENT
Start: 2023-03-10

## 2023-03-10 NOTE — PATIENT INSTRUCTIONS
Continue current medications and will add hydroxyzine as needed for severe anxiety/panic attacks.  This can make you drowsy so do not take before driving.    Follow-up in 6 months for annual physical.

## 2023-03-27 ENCOUNTER — OFFICE VISIT (OUTPATIENT)
Dept: ORTHOPEDICS | Facility: CLINIC | Age: 48
End: 2023-03-27
Payer: COMMERCIAL

## 2023-03-27 ENCOUNTER — ANCILLARY PROCEDURE (OUTPATIENT)
Dept: GENERAL RADIOLOGY | Facility: CLINIC | Age: 48
End: 2023-03-27
Attending: FAMILY MEDICINE
Payer: COMMERCIAL

## 2023-03-27 DIAGNOSIS — M25.562 LEFT KNEE PAIN: ICD-10-CM

## 2023-03-27 DIAGNOSIS — M25.562 ACUTE PAIN OF LEFT KNEE: Primary | ICD-10-CM

## 2023-03-27 PROCEDURE — 73564 X-RAY EXAM KNEE 4 OR MORE: CPT | Mod: LT | Performed by: RADIOLOGY

## 2023-03-27 PROCEDURE — 99203 OFFICE O/P NEW LOW 30 MIN: CPT | Performed by: FAMILY MEDICINE

## 2023-03-27 NOTE — LETTER
3/27/2023         RE: Oren Solano  [address] [facility] [address]        Dear Colleague,    Thank you for referring your patient, Oren Solano, to the [facility] [place]. Please see a copy of my visit note below.    CHIEF COMPLAINT:  Pain and New Patient of the Left Knee       HISTORY OF PRESENT ILLNESS  Mr. Solano is a pleasant 48 year old male who presents to clinic today with a left knee injury.  Oren was pushing a car out of a snow embankment when he slipped on a patch of ice, striking his left knee immediately on the ground.  He felt immediate pain at the anterior medial portion of his left knee.  He has been experiencing pain while walking and does have pain when resting as well.        Additional history: as documented    MEDICAL HISTORY  Patient Active Problem List   Diagnosis     Migraine without aura and without status migrainosus, not intractable     Benign essential hypertension     Family history of colon cancer     Renal stones     Calculus of kidney       Current Outpatient Medications   Medication Sig Dispense Refill     galcanezumab-gnlm (EMGALITY) 120 MG/ML injection INJECT 1 ML SUBCUTANEOUSLY  ONCE EVERY MONTH 3 mL 1     hydrOXYzine (ATARAX) 25 MG tablet Take 0.5-1 tablets (12.5-25 mg) by mouth 3 times daily as needed for anxiety 30 tablet 1     lisinopril (ZESTRIL) 10 MG tablet Take 1 tablet (10 mg) by mouth daily 90 tablet 1     magnesium 100 MG CAPS        Multiple Vitamins-Minerals (MULTIVITAMIN ADULT PO)        SUMAtriptan (IMITREX) 100 MG tablet TAKE ONE TABLET BY MOUTH AT ONSET OF HEADACHE FOR MIGRAINE. MAY REPEAT DOSE IN 2 HOURS. MAX OF 2 TABS/24HRS. 12 tablet 2       Allergies   Allergen Reactions     Amoxicillin GI Disturbance     Seasonal Allergies        Family History   Problem Relation Age of Onset     Colon Polyps Brother      Colon Cancer Maternal Grandfather        Additional medical/Social/Surgical histories reviewed in EPIC  and updated as appropriate.        PHYSICAL EXAM  General  - normal appearance, in no obvious distress  Musculoskeletal - left knee  - inspection: no swelling or effusion, normal bone and joint alignment, no obvious deformity  - palpation: tender anterior medial left knee    Neuro  - no sensory or motor deficit, grossly normal coordination, normal muscle tone             ASSESSMENT & PLAN  Mr. Solano is a 48 year old male who presents to clinic today with left knee pain after a fall directly onto his knee.    I ordered and independently reviewed an x-ray of his knee, this shows no obvious acute issues.    I do think that he at the very least is experiencing a significant bone bruise.  We discussed pathophysiology, as well as some treatment strategies.  I do think that he will do well with a knee sleeve and avoidance of exacerbating activities.  However, if the pain is significant before he leaves for [place] we may consider a knee injection, he is going to get a hold of me if this is the case.    It was a pleasure seeing Oren today.    Wm Cason DO, CAM  Primary Care Sports Medicine      This note was constructed using Dragon dictation software, please excuse any minor errors in spelling, grammar, or syntax.        Again, thank you for allowing me to participate in the care of your patient.        Sincerely,        Wm Cason DO

## 2023-03-27 NOTE — PROGRESS NOTES
CHIEF COMPLAINT:  Pain and New Patient of the Left Knee       HISTORY OF PRESENT ILLNESS  Mr. Solano is a pleasant 48 year old male who presents to clinic today with a left knee injury.  Oren was pushing a car out of a snow embankment when he slipped on a patch of ice, striking his left knee immediately on the ground.  He felt immediate pain at the anterior medial portion of his left knee.  He has been experiencing pain while walking and does have pain when resting as well.        Additional history: as documented    MEDICAL HISTORY  Patient Active Problem List   Diagnosis     Migraine without aura and without status migrainosus, not intractable     Benign essential hypertension     Family history of colon cancer     Renal stones     Calculus of kidney       Current Outpatient Medications   Medication Sig Dispense Refill     galcanezumab-gnlm (EMGALITY) 120 MG/ML injection INJECT 1 ML SUBCUTANEOUSLY  ONCE EVERY MONTH 3 mL 1     hydrOXYzine (ATARAX) 25 MG tablet Take 0.5-1 tablets (12.5-25 mg) by mouth 3 times daily as needed for anxiety 30 tablet 1     lisinopril (ZESTRIL) 10 MG tablet Take 1 tablet (10 mg) by mouth daily 90 tablet 1     magnesium 100 MG CAPS        Multiple Vitamins-Minerals (MULTIVITAMIN ADULT PO)        SUMAtriptan (IMITREX) 100 MG tablet TAKE ONE TABLET BY MOUTH AT ONSET OF HEADACHE FOR MIGRAINE. MAY REPEAT DOSE IN 2 HOURS. MAX OF 2 TABS/24HRS. 12 tablet 2       Allergies   Allergen Reactions     Amoxicillin GI Disturbance     Seasonal Allergies        Family History   Problem Relation Age of Onset     Colon Polyps Brother      Colon Cancer Maternal Grandfather        Additional medical/Social/Surgical histories reviewed in EPIC and updated as appropriate.        PHYSICAL EXAM  General  - normal appearance, in no obvious distress  Musculoskeletal - left knee  - inspection: no swelling or effusion, normal bone and joint alignment, no obvious deformity  - palpation: tender anterior medial left  knee    Neuro  - no sensory or motor deficit, grossly normal coordination, normal muscle tone             ASSESSMENT & PLAN  Mr. Solano is a 48 year old male who presents to clinic today with left knee pain after a fall directly onto his knee.    I ordered and independently reviewed an x-ray of his knee, this shows no obvious acute issues.    I do think that he at the very least is experiencing a significant bone bruise.  We discussed pathophysiology, as well as some treatment strategies.  I do think that he will do well with a knee sleeve and avoidance of exacerbating activities.  However, if the pain is significant before he leaves for [place] we may consider a knee injection, he is going to get a hold of me if this is the case.    It was a pleasure seeing Oren today.    Wm Cason DO, CAM  Primary Care Sports Medicine      This note was constructed using Dragon dictation software, please excuse any minor errors in spelling, grammar, or syntax.

## 2023-04-07 ENCOUNTER — E-VISIT (OUTPATIENT)
Dept: URGENT CARE | Facility: CLINIC | Age: 48
End: 2023-04-07
Payer: COMMERCIAL

## 2023-04-07 DIAGNOSIS — J20.9 ACUTE BRONCHITIS WITH SYMPTOMS > 10 DAYS: Primary | ICD-10-CM

## 2023-04-07 PROCEDURE — 99207 PR NON-BILLABLE SERV PER CHARTING: CPT | Performed by: PHYSICIAN ASSISTANT

## 2023-04-07 NOTE — PATIENT INSTRUCTIONS
Dear Oren Solano,    We are sorry you are not feeling well. Based on the responses you provided, it is recommended that you be seen in-person in urgent care so we can better evaluate your symptoms. Please click here to find the nearest urgent care location to you.   You will not be charged for this Visit. Thank you for trusting us with your care.    Emmie Car PA-C

## 2023-05-06 ENCOUNTER — HEALTH MAINTENANCE LETTER (OUTPATIENT)
Age: 48
End: 2023-05-06

## 2023-05-25 ENCOUNTER — NURSE TRIAGE (OUTPATIENT)
Dept: NURSING | Facility: CLINIC | Age: 48
End: 2023-05-25
Payer: COMMERCIAL

## 2023-05-25 NOTE — TELEPHONE ENCOUNTER
Nurse Triage SBAR    Is this a 2nd Level Triage? YES, LICENSED PRACTITIONER REVIEW IS REQUIRED    Situation: knee and thigh pain    Background: Patient calling with pain in his left knee, behind the knee, and is unable to straighten his leg. Patient was loading equipment when the pain started. Patient was turning and pivoting on his left leg. There is pain when he puts weight on his leg. Pain rating 1/10 when sitting, 8-9/10 if he tries to straighten the knee. No warmth in this area and not tender to the touch. Some pain in the thigh described as a wrapping pain with the worse coming from behind and fading as it goes up the leg.     Patient did travel to [facility] in the begging of May.   No history of previous blood clot.    Protocol recommends SLT with PCP  Call back to patient at [phone]    Assessment: disposition needed    Protocol Recommended Disposition:   Go To ED/UCC Now (Or To Office With PCP Approval)    Recommendation: disposition, next steps     Routed to provider    Does the patient meet one of the following criteria for ADS visit consideration? 16+ years old, with an [facility] PCP     TIP  Providers, please consider if this condition is appropriate for management at one of our Acute and Diagnostic Services sites.     If patient is a good candidate, please use dotphrase <dot>triageresponse and select Refer to ADS to document.        Amanda Rodriges RN   05/25/23 8:53 AM  [facility] Nurse Advisor      Reason for Disposition    Thigh or calf swelling and only 1 side    Additional Information    Negative: Sounds like a life-threatening emergency to the triager    Negative: Followed a knee injury    Negative: Swollen knee joint and fever    Negative: Thigh or calf pain and only 1 side and present > 1 hour    Protocols used: KNEE PAIN-A-[place]

## 2023-05-25 NOTE — TELEPHONE ENCOUNTER
Called and informed patient there are no office visits available today. Did recommend he be evaluated in urgent care. Patient reports [facility] the closes urgent care location to him and will be seen at that location.     Keiry PAIGEN, RN  [facility]

## 2023-08-12 DIAGNOSIS — F41.0 PANIC ATTACK: ICD-10-CM

## 2023-08-14 RX ORDER — HYDROXYZINE HYDROCHLORIDE 25 MG/1
TABLET, FILM COATED ORAL
Qty: 30 TABLET | Refills: 0 | Status: SHIPPED | OUTPATIENT
Start: 2023-08-14

## 2023-10-18 DIAGNOSIS — G43.009 MIGRAINE WITHOUT AURA AND WITHOUT STATUS MIGRAINOSUS, NOT INTRACTABLE: ICD-10-CM

## 2023-10-18 RX ORDER — SUMATRIPTAN 100 MG/1
TABLET, FILM COATED ORAL
Qty: 2 TABLET | Refills: 0 | Status: SHIPPED | OUTPATIENT
Start: 2023-10-18 | End: 2023-10-23

## 2023-10-23 RX ORDER — SUMATRIPTAN 100 MG/1
TABLET, FILM COATED ORAL
Qty: 12 TABLET | Refills: 0 | Status: SHIPPED | OUTPATIENT
Start: 2023-10-23

## 2023-10-23 NOTE — TELEPHONE ENCOUNTER
Called patient to let him know he needs to schedule a follow up appointment but he is out of town for a month or two due to a family issue. Can he do a virtual appointment with or can it wait until he gets back to schedule?

## 2024-05-21 ENCOUNTER — PATIENT OUTREACH (OUTPATIENT)
Dept: GASTROENTEROLOGY | Facility: CLINIC | Age: 49
End: 2024-05-21
Payer: COMMERCIAL

## 2024-07-13 ENCOUNTER — HEALTH MAINTENANCE LETTER (OUTPATIENT)
Age: 49
End: 2024-07-13

## 2025-02-13 PROBLEM — D12.6 ADENOMATOUS COLON POLYP: Status: ACTIVE | Noted: 2025-02-13

## 2025-07-19 ENCOUNTER — HEALTH MAINTENANCE LETTER (OUTPATIENT)
Age: 50
End: 2025-07-19

## (undated) DEVICE — KIT ENDO FIRST STEP DISINFECTANT 200ML W/POUCH EP-4

## (undated) DEVICE — PAD CHUX UNDERPAD 23X24" 7136

## (undated) DEVICE — PREP CHLORAPREP 26ML TINTED ORANGE  260815

## (undated) RX ORDER — LIDOCAINE HYDROCHLORIDE 10 MG/ML
INJECTION, SOLUTION EPIDURAL; INFILTRATION; INTRACAUDAL; PERINEURAL
Status: DISPENSED
Start: 2017-06-19

## (undated) RX ORDER — FENTANYL CITRATE 50 UG/ML
INJECTION, SOLUTION INTRAMUSCULAR; INTRAVENOUS
Status: DISPENSED
Start: 2022-03-11